# Patient Record
Sex: MALE | Race: WHITE | NOT HISPANIC OR LATINO | Employment: FULL TIME | URBAN - METROPOLITAN AREA
[De-identification: names, ages, dates, MRNs, and addresses within clinical notes are randomized per-mention and may not be internally consistent; named-entity substitution may affect disease eponyms.]

---

## 2018-08-13 ENCOUNTER — HOSPITAL ENCOUNTER (EMERGENCY)
Facility: HOSPITAL | Age: 63
Discharge: HOME | End: 2018-08-14
Attending: EMERGENCY MEDICINE | Admitting: EMERGENCY MEDICINE
Payer: COMMERCIAL

## 2018-08-13 ENCOUNTER — APPOINTMENT (EMERGENCY)
Dept: RADIOLOGY | Facility: HOSPITAL | Age: 63
End: 2018-08-13
Payer: COMMERCIAL

## 2018-08-13 DIAGNOSIS — R50.9 FEVER: Primary | ICD-10-CM

## 2018-08-13 DIAGNOSIS — R51.9 ACUTE NONINTRACTABLE HEADACHE, UNSPECIFIED HEADACHE TYPE: ICD-10-CM

## 2018-08-13 LAB
ALBUMIN SERPL BCP-MCNC: 3.7 G/DL (ref 3.5–5)
ALP SERPL-CCNC: 61 U/L (ref 46–116)
ALT SERPL W P-5'-P-CCNC: 29 U/L (ref 12–78)
ANION GAP SERPL CALCULATED.3IONS-SCNC: 9 MMOL/L (ref 4–13)
APTT PPP: 28 SECONDS (ref 24–36)
AST SERPL W P-5'-P-CCNC: 25 U/L (ref 5–45)
BASOPHILS # BLD AUTO: 0.03 THOUSANDS/ΜL (ref 0–0.1)
BASOPHILS NFR BLD AUTO: 1 % (ref 0–1)
BILIRUB SERPL-MCNC: 0.5 MG/DL (ref 0.2–1)
BILIRUB UR QL STRIP: NEGATIVE
BUN SERPL-MCNC: 14 MG/DL (ref 5–25)
CALCIUM SERPL-MCNC: 9.4 MG/DL (ref 8.3–10.1)
CHLORIDE SERPL-SCNC: 96 MMOL/L (ref 100–108)
CLARITY UR: CLEAR
CO2 SERPL-SCNC: 27 MMOL/L (ref 21–32)
COLOR UR: YELLOW
CREAT SERPL-MCNC: 1.13 MG/DL (ref 0.6–1.3)
EOSINOPHIL # BLD AUTO: 0 THOUSAND/ΜL (ref 0–0.61)
EOSINOPHIL NFR BLD AUTO: 0 % (ref 0–6)
ERYTHROCYTE [DISTWIDTH] IN BLOOD BY AUTOMATED COUNT: 12.1 % (ref 11.6–15.1)
GFR SERPL CREATININE-BSD FRML MDRD: 70 ML/MIN/1.73SQ M
GLUCOSE SERPL-MCNC: 126 MG/DL (ref 65–140)
GLUCOSE UR STRIP-MCNC: NEGATIVE MG/DL
HCT VFR BLD AUTO: 41.5 % (ref 36.5–49.3)
HGB BLD-MCNC: 14.1 G/DL (ref 12–17)
HGB UR QL STRIP.AUTO: NEGATIVE
IMM GRANULOCYTES # BLD AUTO: 0.03 THOUSAND/UL (ref 0–0.2)
IMM GRANULOCYTES NFR BLD AUTO: 1 % (ref 0–2)
INR PPP: 1.02 (ref 0.86–1.17)
KETONES UR STRIP-MCNC: ABNORMAL MG/DL
LACTATE SERPL-SCNC: 1.2 MMOL/L (ref 0.5–2)
LEUKOCYTE ESTERASE UR QL STRIP: NEGATIVE
LYMPHOCYTES # BLD AUTO: 0.62 THOUSANDS/ΜL (ref 0.6–4.47)
LYMPHOCYTES NFR BLD AUTO: 9 % (ref 14–44)
MCH RBC QN AUTO: 32.4 PG (ref 26.8–34.3)
MCHC RBC AUTO-ENTMCNC: 34 G/DL (ref 31.4–37.4)
MCV RBC AUTO: 95 FL (ref 82–98)
MONOCYTES # BLD AUTO: 0.54 THOUSAND/ΜL (ref 0.17–1.22)
MONOCYTES NFR BLD AUTO: 8 % (ref 4–12)
NEUTROPHILS # BLD AUTO: 5.42 THOUSANDS/ΜL (ref 1.85–7.62)
NEUTS SEG NFR BLD AUTO: 81 % (ref 43–75)
NITRITE UR QL STRIP: NEGATIVE
NRBC BLD AUTO-RTO: 0 /100 WBCS
PH UR STRIP.AUTO: 5.5 [PH] (ref 4.5–8)
PLATELET # BLD AUTO: 143 THOUSANDS/UL (ref 149–390)
PMV BLD AUTO: 9.2 FL (ref 8.9–12.7)
POTASSIUM SERPL-SCNC: 3.6 MMOL/L (ref 3.5–5.3)
PROT SERPL-MCNC: 7.9 G/DL (ref 6.4–8.2)
PROT UR STRIP-MCNC: ABNORMAL MG/DL
PROTHROMBIN TIME: 13.1 SECONDS (ref 11.8–14.2)
RBC # BLD AUTO: 4.35 MILLION/UL (ref 3.88–5.62)
SODIUM SERPL-SCNC: 132 MMOL/L (ref 136–145)
SP GR UR STRIP.AUTO: 1.01 (ref 1–1.03)
UROBILINOGEN UR QL STRIP.AUTO: 0.2 E.U./DL
WBC # BLD AUTO: 6.64 THOUSAND/UL (ref 4.31–10.16)

## 2018-08-13 PROCEDURE — 87040 BLOOD CULTURE FOR BACTERIA: CPT | Performed by: EMERGENCY MEDICINE

## 2018-08-13 PROCEDURE — 93005 ELECTROCARDIOGRAM TRACING: CPT

## 2018-08-13 PROCEDURE — 36415 COLL VENOUS BLD VENIPUNCTURE: CPT | Performed by: EMERGENCY MEDICINE

## 2018-08-13 PROCEDURE — 85025 COMPLETE CBC W/AUTO DIFF WBC: CPT | Performed by: EMERGENCY MEDICINE

## 2018-08-13 PROCEDURE — 71046 X-RAY EXAM CHEST 2 VIEWS: CPT

## 2018-08-13 PROCEDURE — 81001 URINALYSIS AUTO W/SCOPE: CPT | Performed by: EMERGENCY MEDICINE

## 2018-08-13 PROCEDURE — 83605 ASSAY OF LACTIC ACID: CPT | Performed by: EMERGENCY MEDICINE

## 2018-08-13 PROCEDURE — 87077 CULTURE AEROBIC IDENTIFY: CPT | Performed by: EMERGENCY MEDICINE

## 2018-08-13 PROCEDURE — 85610 PROTHROMBIN TIME: CPT | Performed by: EMERGENCY MEDICINE

## 2018-08-13 PROCEDURE — 80053 COMPREHEN METABOLIC PANEL: CPT | Performed by: EMERGENCY MEDICINE

## 2018-08-13 PROCEDURE — 85730 THROMBOPLASTIN TIME PARTIAL: CPT | Performed by: EMERGENCY MEDICINE

## 2018-08-13 PROCEDURE — 96361 HYDRATE IV INFUSION ADD-ON: CPT

## 2018-08-13 PROCEDURE — 96365 THER/PROPH/DIAG IV INF INIT: CPT

## 2018-08-13 RX ORDER — IBUPROFEN 400 MG/1
400 TABLET ORAL ONCE
Status: COMPLETED | OUTPATIENT
Start: 2018-08-13 | End: 2018-08-13

## 2018-08-13 RX ORDER — AMLODIPINE BESYLATE 5 MG/1
10 TABLET ORAL DAILY
COMMUNITY

## 2018-08-13 RX ORDER — ATORVASTATIN CALCIUM 10 MG/1
10 TABLET, FILM COATED ORAL DAILY
COMMUNITY

## 2018-08-13 RX ORDER — ASPIRIN 81 MG/1
81 TABLET ORAL DAILY
COMMUNITY

## 2018-08-13 RX ORDER — ACETAMINOPHEN 325 MG/1
975 TABLET ORAL ONCE
Status: COMPLETED | OUTPATIENT
Start: 2018-08-13 | End: 2018-08-13

## 2018-08-13 RX ORDER — MULTIVITAMIN
1 TABLET ORAL DAILY
COMMUNITY

## 2018-08-13 RX ORDER — BENAZEPRIL HYDROCHLORIDE 5 MG/1
5 TABLET, FILM COATED ORAL 2 TIMES DAILY
COMMUNITY
End: 2018-08-16 | Stop reason: CLARIF

## 2018-08-13 RX ADMIN — SODIUM CHLORIDE 1000 ML: 0.9 INJECTION, SOLUTION INTRAVENOUS at 23:54

## 2018-08-13 RX ADMIN — SODIUM CHLORIDE 1000 ML: 0.9 INJECTION, SOLUTION INTRAVENOUS at 22:16

## 2018-08-13 RX ADMIN — CEFTRIAXONE 2000 MG: 2 INJECTION, POWDER, FOR SOLUTION INTRAMUSCULAR; INTRAVENOUS at 23:04

## 2018-08-13 RX ADMIN — SODIUM CHLORIDE 1000 ML: 0.9 INJECTION, SOLUTION INTRAVENOUS at 22:33

## 2018-08-13 RX ADMIN — ACETAMINOPHEN 975 MG: 325 TABLET, FILM COATED ORAL at 22:29

## 2018-08-13 RX ADMIN — IBUPROFEN 400 MG: 400 TABLET ORAL at 22:30

## 2018-08-14 VITALS
SYSTOLIC BLOOD PRESSURE: 129 MMHG | DIASTOLIC BLOOD PRESSURE: 69 MMHG | HEIGHT: 75 IN | HEART RATE: 76 BPM | OXYGEN SATURATION: 92 % | TEMPERATURE: 99.9 F | WEIGHT: 217.37 LBS | BODY MASS INDEX: 27.03 KG/M2 | RESPIRATION RATE: 16 BRPM

## 2018-08-14 LAB
ATRIAL RATE: 87 BPM
BACTERIA UR QL AUTO: ABNORMAL /HPF
NON-SQ EPI CELLS URNS QL MICRO: ABNORMAL /HPF
P AXIS: 52 DEGREES
PR INTERVAL: 150 MS
QRS AXIS: -1 DEGREES
QRSD INTERVAL: 94 MS
QT INTERVAL: 350 MS
QTC INTERVAL: 421 MS
RBC #/AREA URNS AUTO: ABNORMAL /HPF
T WAVE AXIS: 37 DEGREES
VENTRICULAR RATE: 87 BPM
WBC #/AREA URNS AUTO: ABNORMAL /HPF

## 2018-08-14 PROCEDURE — 93010 ELECTROCARDIOGRAM REPORT: CPT | Performed by: INTERNAL MEDICINE

## 2018-08-14 PROCEDURE — 96361 HYDRATE IV INFUSION ADD-ON: CPT

## 2018-08-14 PROCEDURE — 99284 EMERGENCY DEPT VISIT MOD MDM: CPT

## 2018-08-14 NOTE — ED NOTES
Pt discharge instructions reviewed, Pt has no further questions at this time  Pt awake and alert, no signs of acute distress noted  Pt ambulated out of ED with a steady gait       Idalia Comment, SHAHZAD  08/14/18 5374

## 2018-08-14 NOTE — DISCHARGE INSTRUCTIONS
Fever in Adults   WHAT YOU NEED TO KNOW:   A fever is an increase in your body temperature  Normal body temperature is 98 6°F (37°C)  Fever is generally defined as greater than 100 4°F (38°C)  Common causes include an infection, injury, or disease such as arthritis  DISCHARGE INSTRUCTIONS:   Return to the emergency department if:   · Your fever does not go away or gets worse even after treatment  · You have a stiff neck and a bad headache  · You are confused  You may not be able to think clearly or remember things like you normally do  · Your heart beats faster than usual even after treatment  · You have shortness of breath or chest pain when you breathe  · You urinate small amounts or not at all  · Your skin, lips, or nails turn blue  Contact your healthcare provider if:   · You have abdominal pain or you feel bloated  · You have nausea or are vomiting  · You have pain or burning when you urinate, or you have pain in your back  · You have questions or concerns about your condition or care  Medicines: You may need any of the following:  · NSAIDs , such as ibuprofen, help decrease swelling, pain, and fever  This medicine is available with or without a doctor's order  NSAIDs can cause stomach bleeding or kidney problems in certain people  If you take blood thinner medicine, always ask if NSAIDs are safe for you  Always read the medicine label and follow directions  Do not give these medicines to children under 10months of age without direction from your child's healthcare provider  · Acetaminophen  decreases pain and fever  It is available without a doctor's order  Ask how much to take and how often to take it  Follow directions  Read the labels of all other medicines you are using to see if they also contain acetaminophen, or ask your doctor or pharmacist  Acetaminophen can cause liver damage if not taken correctly   Do not use more than 4 grams (4,000 milligrams) total of acetaminophen in one day  · Antibiotics  may be given if you have an infection caused by bacteria  · Take your medicine as directed  Contact your healthcare provider if you think your medicine is not helping or if you have side effects  Tell him of her if you are allergic to any medicine  Keep a list of the medicines, vitamins, and herbs you take  Include the amounts, and when and why you take them  Bring the list or the pill bottles to follow-up visits  Carry your medicine list with you in case of an emergency  Follow up with your healthcare provider as directed:  Write down your questions so you remember to ask them during your visits  Self-care:   · Drink more liquids as directed  A fever makes you sweat  This can increase your risk for dehydration  Liquids can help prevent dehydration  ¨ Drink at least 6 to 8 eight-ounce cups of clear liquids each day  Drink water, juice, or broth  Do not drink sports drinks  They may contain caffeine  ¨ Ask your healthcare provider if you should drink an oral rehydration solution (ORS)  An ORS has the right amounts of water, salts, and sugar you need to replace body fluids  · Dress in lightweight clothes  Shivers may be a sign that your fever is rising  Do not put extra blankets or clothes on  This may cause your fever to rise even higher  Dress in light, comfortable clothing  Use a lightweight blanket or sheet when you sleep  Change your clothes, blanket, or sheets if they get wet  · Cool yourself safely  Take a bath in cool or lukewarm water  Use an ice pack wrapped in a small towel or wet a washcloth with cool water  Place the ice pack or wet washcloth on your forehead or the back of your neck  © 2017 [employer] Information is for End User's use only and may not be sold, redistributed or otherwise used for commercial purposes   All illustrations and images included in CareNotes® are the copyrighted property of [employer]  or Prakash Ruiz  The above information is an  only  It is not intended as medical advice for individual conditions or treatments  Talk to your doctor, nurse or pharmacist before following any medical regimen to see if it is safe and effective for you

## 2018-08-14 NOTE — ED PROVIDER NOTES
History  Chief Complaint   Patient presents with    Fever - 9 weeks to 74 years     Pt reports fever as high as 103 since yesterday with aches and chills  Pt rook Axel Aspirin x3 at [employer], pt also states "pounding headache"     This 70-year-old male presents with 24 hour history of progressively worsening fever  Patient states he has headache, body aches, aching joints, nausea with decreased appetite  Patient has been alternating baby aspirin and Tylenol for his fever with limited if any improvement  Patient does have a sick contact with a granddaughter who had a cold last week  Patient denies any cough or shortness of breath  Patient denies any ear or throat pain  Patient denies any viral symptoms  Patient denies any rash  Patient denies any urinary complaints  Patient did have a couple episodes of diarrhea but denies any abdominal pain  History provided by:  Patient   used: No    Fever - 9 weeks to 74 years   Max temp prior to arrival:  103  Temp source:  Oral  Severity:  Moderate  Onset quality:  Gradual  Duration:  1 day  Progression:  Worsening  Chronicity:  New  Relieved by:  Acetaminophen  Worsened by:  Nothing  Ineffective treatments:  Aspirin  Associated symptoms: chills, headaches, myalgias and nausea    Associated symptoms: no chest pain, no confusion, no cough, no diarrhea, no dysuria, no ear pain, no rash, no sore throat and no vomiting    Risk factors: sick contacts        Prior to Admission Medications   Prescriptions Last Dose Informant Patient Reported? Taking?    Multiple Vitamin (MULTIVITAMIN) tablet   Yes Yes   Sig: Take 1 tablet by mouth daily   amLODIPine (NORVASC) 5 mg tablet   Yes Yes   Sig: Take 5 mg by mouth daily   aspirin (ECOTRIN LOW STRENGTH) 81 mg EC tablet   Yes Yes   Sig: Take 81 mg by mouth daily   atorvastatin (LIPITOR) 20 mg tablet   Yes Yes   Sig: Take 20 mg by mouth daily   benazepril (LOTENSIN) 5 mg tablet   Yes Yes   Sig: Take 5 mg by mouth 2 (two) times a day      Facility-Administered Medications: None       History reviewed  No pertinent past medical history  History reviewed  No pertinent surgical history  History reviewed  No pertinent family history  I have reviewed and agree with the history as documented  Social History   Substance Use Topics    Smoking status: Never Smoker    Smokeless tobacco: Not on file    Alcohol use Yes      Comment: daily 2-3 anisha        Review of Systems   Constitutional: Positive for chills, diaphoresis and fever  Negative for activity change and appetite change  HENT: Negative for ear pain, facial swelling, sore throat, tinnitus and voice change  Eyes: Negative for photophobia, pain and redness  Respiratory: Negative for cough, chest tightness, shortness of breath and wheezing  Cardiovascular: Negative for chest pain, palpitations and leg swelling  Gastrointestinal: Positive for nausea  Negative for abdominal distention, abdominal pain, constipation, diarrhea and vomiting  Genitourinary: Negative for difficulty urinating, dysuria, flank pain, hematuria and urgency  Musculoskeletal: Positive for arthralgias and myalgias  Negative for back pain, gait problem and neck pain  Skin: Negative for rash and wound  Neurological: Positive for headaches  Negative for dizziness, syncope, speech difficulty and weakness  Psychiatric/Behavioral: Negative for agitation, behavioral problems and confusion  Physical Exam  Physical Exam   Constitutional: He is oriented to person, place, and time  He appears well-developed and well-nourished  He is cooperative  No distress  HENT:   Head: Normocephalic and atraumatic  Mouth/Throat: Oropharynx is clear and moist    Eyes: EOM and lids are normal  Pupils are equal, round, and reactive to light  Right eye exhibits no discharge  Left eye exhibits no discharge  Right conjunctiva is not injected  Left conjunctiva is not injected     Neck: Trachea normal, normal range of motion, full passive range of motion without pain and phonation normal  Neck supple  Cardiovascular: Normal rate, regular rhythm, normal heart sounds and normal pulses  No murmur heard  Pulses:       Dorsalis pedis pulses are 2+ on the right side, and 2+ on the left side  Pulmonary/Chest: Effort normal and breath sounds normal  He has no wheezes  He exhibits no tenderness  Abdominal: Soft  He exhibits no distension  There is no tenderness  Musculoskeletal: Normal range of motion  He exhibits no edema  Neurological: He is alert and oriented to person, place, and time  He has normal strength  No cranial nerve deficit  GCS eye subscore is 4  GCS verbal subscore is 5  GCS motor subscore is 6  Skin: Skin is warm, dry and intact  Capillary refill takes less than 2 seconds  No rash noted  Psychiatric: He has a normal mood and affect  His speech is normal and behavior is normal    Vitals reviewed        Vital Signs  ED Triage Vitals   Temperature Pulse Respirations Blood Pressure SpO2   08/13/18 2119 08/13/18 2119 08/13/18 2119 08/13/18 2119 08/13/18 2119   (!) 102 9 °F (39 4 °C) 97 18 153/75 95 %      Temp Source Heart Rate Source Patient Position - Orthostatic VS BP Location FiO2 (%)   08/13/18 2119 08/13/18 2119 08/13/18 2119 08/13/18 2119 --   Oral Monitor Sitting Left arm       Pain Score       08/13/18 2201       4           Vitals:    08/13/18 2230 08/13/18 2300 08/13/18 2355 08/14/18 0000   BP: 159/81 119/77 127/70 127/69   Pulse: 86 88 77 77   Patient Position - Orthostatic VS: Lying Lying Lying Lying       Visual Acuity      ED Medications  Medications   sodium chloride 0 9 % bolus 1,000 mL (0 mL Intravenous Stopped 8/13/18 2350)     Followed by   sodium chloride 0 9 % bolus 1,000 mL (0 mL Intravenous Stopped 8/13/18 2350)     Followed by   sodium chloride 0 9 % bolus 1,000 mL (1,000 mL Intravenous New Bag 8/13/18 2354)   ibuprofen (MOTRIN) tablet 400 mg (400 mg Oral Given 8/13/18 2230)   acetaminophen (TYLENOL) tablet 975 mg (975 mg Oral Given 8/13/18 2229)   cefTRIAXone (ROCEPHIN) 2,000 mg in dextrose 5 % 50 mL IVPB (0 mg Intravenous Stopped 8/13/18 2350)       Diagnostic Studies  Results Reviewed     Procedure Component Value Units Date/Time    UA w Reflex to Microscopic w Reflex to Culture [[id number]]  (Abnormal) Collected:  08/13/18 2352    Lab Status:  Final result Specimen:  Urine from Urine, Clean Catch Updated:  08/13/18 2359     Color, UA Yellow     Clarity, UA Clear     Specific Gravity, UA 1 015     pH, UA 5 5     Leukocytes, UA Negative     Nitrite, UA Negative     Protein, UA Trace (A) mg/dl      Glucose, UA Negative mg/dl      Ketones, UA Trace (A) mg/dl      Urobilinogen, UA 0 2 E U /dl      Bilirubin, UA Negative     Blood, UA Negative    Urine Microscopic [[id number]] Collected:  08/13/18 2352    Lab Status: In process Specimen:  Urine from Urine, Clean Catch Updated:  08/13/18 2359    Lactic acid x2 [[id number]]  (Normal) Collected:  08/13/18 2212    Lab Status:  Final result Specimen:  Blood from Arm, Right Updated:  08/13/18 2252     LACTIC ACID 1 2 mmol/L     Narrative:         Result may be elevated if tourniquet was used during collection      Comprehensive metabolic panel [[id number]]  (Abnormal) Collected:  08/13/18 2211    Lab Status:  Final result Specimen:  Blood from Arm, Right Updated:  08/13/18 2244     Sodium 132 (L) mmol/L      Potassium 3 6 mmol/L      Chloride 96 (L) mmol/L      CO2 27 mmol/L      Anion Gap 9 mmol/L      BUN 14 mg/dL      Creatinine 1 13 mg/dL      Glucose 126 mg/dL      Calcium 9 4 mg/dL      AST 25 U/L      ALT 29 U/L      Alkaline Phosphatase 61 U/L      Total Protein 7 9 g/dL      Albumin 3 7 g/dL      Total Bilirubin 0 50 mg/dL      eGFR 70 ml/min/1 73sq m     Narrative:         National Kidney Disease Education Program recommendations are as follows:  GFR calculation is accurate only with a steady state creatinine  Chronic Kidney disease less than 60 ml/min/1 73 sq  meters  Kidney failure less than 15 ml/min/1 73 sq  meters  Protime-INR [[id number]]  (Normal) Collected:  08/13/18 2212    Lab Status:  Final result Specimen:  Blood from Arm, Right Updated:  08/13/18 2239     Protime 13 1 seconds      INR 1 02    APTT [[id number]]  (Normal) Collected:  08/13/18 2212    Lab Status:  Final result Specimen:  Blood from Arm, Right Updated:  08/13/18 2239     PTT 28 seconds     CBC and differential [[id number]]  (Abnormal) Collected:  08/13/18 2211    Lab Status:  Final result Specimen:  Blood from Arm, Right Updated:  08/13/18 2224     WBC 6 64 Thousand/uL      RBC 4 35 Million/uL      Hemoglobin 14 1 g/dL      Hematocrit 41 5 %      MCV 95 fL      MCH 32 4 pg      MCHC 34 0 g/dL      RDW 12 1 %      MPV 9 2 fL      Platelets 626 (L) Thousands/uL      nRBC 0 /100 WBCs      Neutrophils Relative 81 (H) %      Immat GRANS % 1 %      Lymphocytes Relative 9 (L) %      Monocytes Relative 8 %      Eosinophils Relative 0 %      Basophils Relative 1 %      Neutrophils Absolute 5 42 Thousands/µL      Immature Grans Absolute 0 03 Thousand/uL      Lymphocytes Absolute 0 62 Thousands/µL      Monocytes Absolute 0 54 Thousand/µL      Eosinophils Absolute 0 00 Thousand/µL      Basophils Absolute 0 03 Thousands/µL     Blood culture #1 [[id number]] Collected:  08/13/18 2211    Lab Status: In process Specimen:  Blood from Arm, Right Updated:  08/13/18 2218    Blood culture #2 [[id number]] Collected:  08/13/18 2213    Lab Status:   In process Specimen:  Blood from Arm, Left Updated:  08/13/18 2218    Lactic acid x2 [[id number]]     Lab Status:  No result Specimen:  Blood                  XR chest pa & lateral   ED Interpretation by Eric Charlton MD (08/13 2309)   No acute cardiopulmonary process                 Procedures  ECG 12 Lead Documentation  Date/Time: 8/13/2018 10:21 PM  Performed by: Mony Landers  Authorized by: Mony Landers     Indications / Diagnosis: Sepsis  ECG reviewed by me, the ED Provider: yes    Patient location:  ED  Previous ECG:     Previous ECG:  Unavailable  Interpretation:     Interpretation: normal    Rate:     ECG rate:  87    ECG rate assessment: normal    Rhythm:     Rhythm: sinus rhythm    Ectopy:     Ectopy: none    QRS:     QRS axis:  Normal    QRS intervals:  Normal  Conduction:     Conduction: normal    ST segments:     ST segments:  Normal  T waves:     T waves: normal             Phone Contacts  ED Phone Contact    ED Course                         Initial Sepsis Screening     [address] Name 08/13/18 2200                Is the patient's history suggestive of a new or worsening infection? (!)  Yes (Proceed)  -AR        Suspected source of infection suspect infection, source unknown  -AR        Are two or more of the following signs & symptoms of infection both present and new to the patient? No  -AR        Indicate SIRS criteria Hyperthemia > 38 3C (100 9F)  -AR        If the answer is yes to both questions, suspicion of sepsis is present          If severe sepsis is present AND tissue hypoperfusion perists in the hour after fluid resuscitation or lactate > 4, the patient meets criteria for SEPTIC SHOCK          Are any of the following organ dysfunction criteria present within 6 hours of suspected infection and SIRS criteria that are NOT considered to be chronic conditions?         Organ dysfunction          Date of presentation of severe sepsis          Time of presentation of severe sepsis          Tissue hypoperfusion persists in the hour after crystalloid fluid administration, evidenced, by either:          Was hypotension present within one hour of the conclusion of crystalloid fluid administration?           Date of presentation of septic shock          Time of presentation of septic shock            User Key  (r) = Recorded By, (t) = Taken By, (c) = Cosigned By    [address] Name Provider Trisha Gordon MD Physician MDM  Number of Diagnoses or Management Options  Diagnosis management comments: Patient's presentation is concerning for sepsis  Will do full evaluation to search for cause  Will re-evaluate after results  Patient's workup here demonstrates no evidence of sepsis  There is no source for his infection either  Patient did receive one dose of empiric Rocephin  Patient likely has a viral infection  Patient states he feels much better after the fluids and Tylenol  Patient's temperature currently is 99 9  Will discharge patient  Will write note for work until three days from now  Amount and/or Complexity of Data Reviewed  Clinical lab tests: ordered and reviewed  Tests in the radiology section of CPT®: ordered and reviewed  Tests in the medicine section of CPT®: ordered and reviewed  Independent visualization of images, tracings, or specimens: yes    Risk of Complications, Morbidity, and/or Mortality  Presenting problems: high  Diagnostic procedures: high  Management options: moderate    Patient Progress  Patient progress: improved    CritCare Time    Disposition  Final diagnoses:   Fever   Acute nonintractable headache, unspecified headache type     Time reflects when diagnosis was documented in both MDM as applicable and the Disposition within this note     Time User Action Codes Description Comment    8/14/2018 12:07 AM Nesha Kelley Add [R50 9] Fever     8/14/2018 12:07 AM Nesha Kelley Add [R51] Acute nonintractable headache, unspecified headache type       ED Disposition     ED Disposition Condition Comment    Discharge  [address]  [facility] [address] discharge to home/self care  Condition at discharge: Stable        Follow-up Information     Follow up With Specialties Details Why Jose Angel Del Castillo MD Internal Medicine Schedule an appointment as soon as possible for a visit in 4 days If you continue to have fevers and are unable to return to work [phone]  [address] [place] [id number]  [phone]            Patient's Medications   Discharge Prescriptions    No medications on file     No discharge procedures on file      ED Provider  Electronically Signed by           Rober Cheung MD  08/14/18 0639

## 2018-08-16 ENCOUNTER — HOSPITAL ENCOUNTER (INPATIENT)
Facility: HOSPITAL | Age: 63
LOS: 2 days | Discharge: HOME | DRG: 372 | End: 2018-08-18
Attending: EMERGENCY MEDICINE | Admitting: INTERNAL MEDICINE
Payer: COMMERCIAL

## 2018-08-16 DIAGNOSIS — R78.81 GRAM-NEGATIVE BACTEREMIA: Primary | ICD-10-CM

## 2018-08-16 PROBLEM — K52.9 GASTROENTERITIS: Status: ACTIVE | Noted: 2018-08-16

## 2018-08-16 PROBLEM — F10.10 ALCOHOL ABUSE: Status: ACTIVE | Noted: 2018-08-16

## 2018-08-16 PROBLEM — E78.5 HLD (HYPERLIPIDEMIA): Status: ACTIVE | Noted: 2018-08-16

## 2018-08-16 LAB
ANION GAP SERPL CALCULATED.3IONS-SCNC: 6 MMOL/L (ref 4–13)
BACTERIA UR QL AUTO: ABNORMAL /HPF
BASOPHILS # BLD AUTO: 0.02 THOUSANDS/ΜL (ref 0–0.1)
BASOPHILS NFR BLD AUTO: 0 % (ref 0–1)
BILIRUB UR QL STRIP: ABNORMAL
BUN SERPL-MCNC: 17 MG/DL (ref 5–25)
CALCIUM SERPL-MCNC: 9.2 MG/DL (ref 8.3–10.1)
CAOX CRY URNS QL MICRO: ABNORMAL /HPF
CHLORIDE SERPL-SCNC: 106 MMOL/L (ref 100–108)
CLARITY UR: CLEAR
CO2 SERPL-SCNC: 29 MMOL/L (ref 21–32)
COLOR UR: YELLOW
CREAT SERPL-MCNC: 1.05 MG/DL (ref 0.6–1.3)
EOSINOPHIL # BLD AUTO: 0.1 THOUSAND/ΜL (ref 0–0.61)
EOSINOPHIL NFR BLD AUTO: 2 % (ref 0–6)
ERYTHROCYTE [DISTWIDTH] IN BLOOD BY AUTOMATED COUNT: 12.2 % (ref 11.6–15.1)
GFR SERPL CREATININE-BSD FRML MDRD: 76 ML/MIN/1.73SQ M
GLUCOSE SERPL-MCNC: 103 MG/DL (ref 65–140)
GLUCOSE UR STRIP-MCNC: NEGATIVE MG/DL
HCT VFR BLD AUTO: 39.2 % (ref 36.5–49.3)
HGB BLD-MCNC: 13.4 G/DL (ref 12–17)
HGB UR QL STRIP.AUTO: ABNORMAL
HYALINE CASTS #/AREA URNS LPF: ABNORMAL /LPF
IMM GRANULOCYTES # BLD AUTO: 0.03 THOUSAND/UL (ref 0–0.2)
IMM GRANULOCYTES NFR BLD AUTO: 1 % (ref 0–2)
KETONES UR STRIP-MCNC: NEGATIVE MG/DL
LACTATE SERPL-SCNC: 0.7 MMOL/L (ref 0.5–2)
LEUKOCYTE ESTERASE UR QL STRIP: NEGATIVE
LYMPHOCYTES # BLD AUTO: 1.42 THOUSANDS/ΜL (ref 0.6–4.47)
LYMPHOCYTES NFR BLD AUTO: 26 % (ref 14–44)
MCH RBC QN AUTO: 32.9 PG (ref 26.8–34.3)
MCHC RBC AUTO-ENTMCNC: 34.2 G/DL (ref 31.4–37.4)
MCV RBC AUTO: 96 FL (ref 82–98)
MONOCYTES # BLD AUTO: 0.96 THOUSAND/ΜL (ref 0.17–1.22)
MONOCYTES NFR BLD AUTO: 17 % (ref 4–12)
MUCOUS THREADS UR QL AUTO: ABNORMAL
NEUTROPHILS # BLD AUTO: 3.02 THOUSANDS/ΜL (ref 1.85–7.62)
NEUTS SEG NFR BLD AUTO: 54 % (ref 43–75)
NITRITE UR QL STRIP: NEGATIVE
NON-SQ EPI CELLS URNS QL MICRO: ABNORMAL /HPF
NRBC BLD AUTO-RTO: 0 /100 WBCS
PH UR STRIP.AUTO: 6 [PH] (ref 4.5–8)
PLATELET # BLD AUTO: 178 THOUSANDS/UL (ref 149–390)
PMV BLD AUTO: 9.1 FL (ref 8.9–12.7)
POTASSIUM SERPL-SCNC: 3.5 MMOL/L (ref 3.5–5.3)
PROT UR STRIP-MCNC: ABNORMAL MG/DL
RBC # BLD AUTO: 4.07 MILLION/UL (ref 3.88–5.62)
RBC #/AREA URNS AUTO: ABNORMAL /HPF
SODIUM SERPL-SCNC: 141 MMOL/L (ref 136–145)
SP GR UR STRIP.AUTO: 1.02 (ref 1–1.03)
UROBILINOGEN UR QL STRIP.AUTO: 0.2 E.U./DL
WBC # BLD AUTO: 5.55 THOUSAND/UL (ref 4.31–10.16)
WBC #/AREA URNS AUTO: ABNORMAL /HPF

## 2018-08-16 PROCEDURE — 83605 ASSAY OF LACTIC ACID: CPT | Performed by: EMERGENCY MEDICINE

## 2018-08-16 PROCEDURE — 85025 COMPLETE CBC W/AUTO DIFF WBC: CPT | Performed by: EMERGENCY MEDICINE

## 2018-08-16 PROCEDURE — 80048 BASIC METABOLIC PNL TOTAL CA: CPT | Performed by: EMERGENCY MEDICINE

## 2018-08-16 PROCEDURE — 96361 HYDRATE IV INFUSION ADD-ON: CPT

## 2018-08-16 PROCEDURE — 99223 1ST HOSP IP/OBS HIGH 75: CPT | Performed by: PHYSICIAN ASSISTANT

## 2018-08-16 PROCEDURE — 36415 COLL VENOUS BLD VENIPUNCTURE: CPT | Performed by: EMERGENCY MEDICINE

## 2018-08-16 PROCEDURE — 87040 BLOOD CULTURE FOR BACTERIA: CPT | Performed by: EMERGENCY MEDICINE

## 2018-08-16 PROCEDURE — 96374 THER/PROPH/DIAG INJ IV PUSH: CPT

## 2018-08-16 PROCEDURE — 99284 EMERGENCY DEPT VISIT MOD MDM: CPT

## 2018-08-16 PROCEDURE — 81001 URINALYSIS AUTO W/SCOPE: CPT | Performed by: EMERGENCY MEDICINE

## 2018-08-16 RX ORDER — AMLODIPINE BESYLATE 10 MG/1
10 TABLET ORAL DAILY
Status: DISCONTINUED | OUTPATIENT
Start: 2018-08-17 | End: 2018-08-18 | Stop reason: HOSPADM

## 2018-08-16 RX ORDER — SODIUM CHLORIDE 9 MG/ML
125 INJECTION, SOLUTION INTRAVENOUS CONTINUOUS
Status: DISCONTINUED | OUTPATIENT
Start: 2018-08-16 | End: 2018-08-16

## 2018-08-16 RX ORDER — THIAMINE MONONITRATE (VIT B1) 100 MG
100 TABLET ORAL DAILY
Status: DISCONTINUED | OUTPATIENT
Start: 2018-08-17 | End: 2018-08-18 | Stop reason: HOSPADM

## 2018-08-16 RX ORDER — BENAZEPRIL HYDROCHLORIDE AND HYDROCHLOROTHIAZIDE 20; 12.5 MG/1; MG/1
1 TABLET ORAL 2 TIMES DAILY
COMMUNITY

## 2018-08-16 RX ORDER — LISINOPRIL 20 MG/1
20 TABLET ORAL DAILY
Status: DISCONTINUED | OUTPATIENT
Start: 2018-08-17 | End: 2018-08-18 | Stop reason: HOSPADM

## 2018-08-16 RX ORDER — ATORVASTATIN CALCIUM 10 MG/1
10 TABLET, FILM COATED ORAL DAILY
Status: DISCONTINUED | OUTPATIENT
Start: 2018-08-17 | End: 2018-08-18 | Stop reason: HOSPADM

## 2018-08-16 RX ORDER — HEPARIN SODIUM 5000 [USP'U]/ML
5000 INJECTION, SOLUTION INTRAVENOUS; SUBCUTANEOUS EVERY 8 HOURS SCHEDULED
Status: DISCONTINUED | OUTPATIENT
Start: 2018-08-16 | End: 2018-08-18 | Stop reason: HOSPADM

## 2018-08-16 RX ORDER — ASPIRIN 81 MG/1
81 TABLET ORAL DAILY
Status: DISCONTINUED | OUTPATIENT
Start: 2018-08-17 | End: 2018-08-18 | Stop reason: HOSPADM

## 2018-08-16 RX ORDER — ACETAMINOPHEN 325 MG/1
650 TABLET ORAL EVERY 6 HOURS PRN
Status: DISCONTINUED | OUTPATIENT
Start: 2018-08-16 | End: 2018-08-18 | Stop reason: HOSPADM

## 2018-08-16 RX ORDER — FOLIC ACID 1 MG/1
1 TABLET ORAL DAILY
Status: DISCONTINUED | OUTPATIENT
Start: 2018-08-17 | End: 2018-08-18 | Stop reason: HOSPADM

## 2018-08-16 RX ORDER — ONDANSETRON 2 MG/ML
4 INJECTION INTRAMUSCULAR; INTRAVENOUS EVERY 6 HOURS PRN
Status: DISCONTINUED | OUTPATIENT
Start: 2018-08-16 | End: 2018-08-18 | Stop reason: HOSPADM

## 2018-08-16 RX ORDER — SODIUM CHLORIDE 9 MG/ML
75 INJECTION, SOLUTION INTRAVENOUS CONTINUOUS
Status: DISCONTINUED | OUTPATIENT
Start: 2018-08-16 | End: 2018-08-18 | Stop reason: HOSPADM

## 2018-08-16 RX ADMIN — SODIUM CHLORIDE 1000 ML: 0.9 INJECTION, SOLUTION INTRAVENOUS at 19:03

## 2018-08-16 RX ADMIN — CEFEPIME HYDROCHLORIDE 2000 MG: 2 INJECTION, POWDER, FOR SOLUTION INTRAVENOUS at 19:59

## 2018-08-16 RX ADMIN — SODIUM CHLORIDE 75 ML/HR: 0.9 INJECTION, SOLUTION INTRAVENOUS at 22:31

## 2018-08-16 NOTE — ED PROVIDER NOTES
History  Chief Complaint   Patient presents with    Abnormal Lab     pt called to come back to ed for positive blood cultures after being seen in ED sunday for fevers unrelieved by tylenol and diarrhea  16-year-old male returns to emergency department for re-evaluation after being notified that he had 2 positive blood cultures that were drawn in the emergency department on August 13th  Patient states that he developed fever with chills arthralgias, body aches and diarrhea on Monday 4 days ago  The etiology of his symptoms was unclear however he felt better and was discharged home  He has continued to improve over the past 2 days he has not had fever for over 24 hours  He did have multiple episodes of loose bright green color diarrhea that have slow down  He did not have a bowel movement today  His blood cultures have both grown out curved gram-negative rods  He does recall eating clams on Sunday 1 day prior to the onset of his symptoms  He did discard some clams that appeared to be dead        History provided by:  Patient and medical records   used: No    Medical Problem   Location:  Recent febrile illness  Quality:  Resolving  Severity:  Severe  Onset quality:  Sudden  Progression:  Partially resolved  Chronicity:  New  Context:  May have eaten spoiled clams  Relieved by:  Nothing  Worsened by:  Nothing  Associated symptoms: abdominal pain, diarrhea and fever    Associated symptoms: no cough and no vomiting        Prior to Admission Medications   Prescriptions Last Dose Informant Patient Reported? Taking?    Multiple Vitamin (MULTIVITAMIN) tablet 8/16/2018 at Unknown time  Yes Yes   Sig: Take 1 tablet by mouth daily   amLODIPine (NORVASC) 5 mg tablet 8/16/2018 at Unknown time  Yes Yes   Sig: Take 10 mg by mouth daily     aspirin (ECOTRIN LOW STRENGTH) 81 mg EC tablet 8/16/2018 at Unknown time  Yes Yes   Sig: Take 81 mg by mouth daily   atorvastatin (LIPITOR) 10 mg tablet 8/16/2018 at Unknown time  Yes Yes   Sig: Take 10 mg by mouth daily     benazepril-hydrochlorthiazide (LOTENSIN HCT) 20-12 5 MG per tablet Unknown at Unknown time  Yes No   Sig: Take 1 tablet by mouth 2 (two) times a day      Facility-Administered Medications: None       Past Medical History:   Diagnosis Date    HLD (hyperlipidemia)     HTN (hypertension)        Past Surgical History:   Procedure Laterality Date    HERNIA REPAIR      20 years ago       Family History   Problem Relation Age of Onset    Diabetes Father     Heart disease Father     Prostate cancer Brother      I have reviewed and agree with the history as documented  Social History   Substance Use Topics    Smoking status: Never Smoker    Smokeless tobacco: Never Used    Alcohol use Yes      Comment: daily 2-4 anisha        Review of Systems   Constitutional: Positive for appetite change and fever  Respiratory: Negative for cough  Gastrointestinal: Positive for abdominal pain and diarrhea  Negative for vomiting  Genitourinary: Negative for dysuria  Musculoskeletal: Negative for back pain  Neurological: Negative for weakness  All other systems reviewed and are negative  Physical Exam  Physical Exam   Constitutional: He is oriented to person, place, and time  Vital signs are normal  He appears well-developed and well-nourished  HENT:   Head: Normocephalic and atraumatic  Eyes: Conjunctivae and EOM are normal  Pupils are equal, round, and reactive to light  Neck: Normal range of motion  Neck supple  Cardiovascular: Normal rate, regular rhythm, normal heart sounds and intact distal pulses  Pulmonary/Chest: Effort normal and breath sounds normal  No accessory muscle usage  No respiratory distress  He exhibits no tenderness  Abdominal: Soft  Normal appearance and bowel sounds are normal  He exhibits no distension  There is no tenderness  There is no rebound and no guarding  Musculoskeletal: Normal range of motion   He exhibits no edema, tenderness or deformity  Lymphadenopathy:     He has no cervical adenopathy  Neurological: He is alert and oriented to person, place, and time  He has normal strength and normal reflexes  Coordination normal    Skin: Skin is warm, dry and intact  No rash noted  Psychiatric: He has a normal mood and affect  His behavior is normal  Judgment and thought content normal    Nursing note and vitals reviewed        Vital Signs  ED Triage Vitals   Temperature Pulse Respirations Blood Pressure SpO2   08/16/18 1741 08/16/18 1741 08/16/18 1741 08/16/18 1741 08/16/18 1741   98 3 °F (36 8 °C) 70 18 152/81 97 %      Temp Source Heart Rate Source Patient Position - Orthostatic VS BP Location FiO2 (%)   08/16/18 1741 08/16/18 1741 08/16/18 1741 08/16/18 1741 --   Oral Monitor Sitting Right arm       Pain Score       08/16/18 2000       No Pain           Vitals:    08/16/18 1741 08/16/18 1941 08/16/18 2000 08/16/18 2117   BP: 152/81 151/87 162/97 156/91   Pulse: 70 60 72 67   Patient Position - Orthostatic VS: Sitting Lying Sitting Sitting       Visual Acuity      ED Medications  Medications   amLODIPine (NORVASC) tablet 10 mg (not administered)   aspirin (ECOTRIN LOW STRENGTH) EC tablet 81 mg (not administered)   atorvastatin (LIPITOR) tablet 10 mg (not administered)   lisinopril (ZESTRIL) tablet 20 mg (not administered)   thiamine (VITAMIN B1) tablet 100 mg (not administered)   folic acid (FOLVITE) tablet 1 mg (not administered)   multivitamin-minerals (CENTRUM) tablet 1 tablet (not administered)   sodium chloride 0 9 % infusion (75 mL/hr Intravenous New Bag 8/16/18 2231)   ondansetron (ZOFRAN) injection 4 mg (not administered)   heparin (porcine) subcutaneous injection 5,000 Units (5,000 Units Subcutaneous Not Given 8/16/18 2300)   acetaminophen (TYLENOL) tablet 650 mg (not administered)   cefepime (MAXIPIME) 2,000 mg in dextrose 5 % 50 mL IVPB (not administered)   sodium chloride 0 9 % bolus 1,000 mL (0 mL Intravenous Stopped 8/16/18 2111)   cefepime (MAXIPIME) 2 g/50 mL dextrose IVPB (0 mg Intravenous Stopped 8/16/18 2111)       Diagnostic Studies  Results Reviewed     Procedure Component Value Units Date/Time    Lactic acid, plasma [[id number]]  (Normal) Collected:  08/16/18 1902    Lab Status:  Final result Specimen:  Blood from Arm, Left Updated:  08/16/18 1948     LACTIC ACID 0 7 mmol/L     Narrative:         Result may be elevated if tourniquet was used during collection  Urine Microscopic [[id number]]  (Abnormal) Collected:  08/16/18 1903    Lab Status:  Final result Specimen:  Urine from Urine, Clean Catch Updated:  08/16/18 1945     RBC, UA 2-4 (A) /hpf      WBC, UA None Seen /hpf      Epithelial Cells Occasional /hpf      Bacteria, UA Occasional /hpf      Hyaline Casts, UA 1-2 (A) /lpf      Ca Oxalate Hermila, UA Moderate (A) /hpf      MUCOUS THREADS Moderate (A)    Basic metabolic panel [[id number]] Collected:  08/16/18 1902    Lab Status:  Final result Specimen:  Blood from Arm, Left Updated:  08/16/18 1939     Sodium 141 mmol/L      Potassium 3 5 mmol/L      Chloride 106 mmol/L      CO2 29 mmol/L      Anion Gap 6 mmol/L      BUN 17 mg/dL      Creatinine 1 05 mg/dL      Glucose 103 mg/dL      Calcium 9 2 mg/dL      eGFR 76 ml/min/1 73sq m     Narrative:         National Kidney Disease Education Program recommendations are as follows:  GFR calculation is accurate only with a steady state creatinine  Chronic Kidney disease less than 60 ml/min/1 73 sq  meters  Kidney failure less than 15 ml/min/1 73 sq  meters  Blood culture #1 [[id number]] Collected:  08/16/18 1933    Lab Status:   In process Specimen:  Blood from Arm, Right Updated:  08/16/18 1937    CBC and differential [[id number]]  (Abnormal) Collected:  08/16/18 1902    Lab Status:  Final result Specimen:  Blood from Arm, Left Updated:  08/16/18 1921     WBC 5 55 Thousand/uL      RBC 4 07 Million/uL      Hemoglobin 13 4 g/dL      Hematocrit 39 2 %      MCV 96 fL      MCH 32 9 pg      MCHC 34 2 g/dL      RDW 12 2 %      MPV 9 1 fL      Platelets 420 Thousands/uL      nRBC 0 /100 WBCs      Neutrophils Relative 54 %      Immat GRANS % 1 %      Lymphocytes Relative 26 %      Monocytes Relative 17 (H) %      Eosinophils Relative 2 %      Basophils Relative 0 %      Neutrophils Absolute 3 02 Thousands/µL      Immature Grans Absolute 0 03 Thousand/uL      Lymphocytes Absolute 1 42 Thousands/µL      Monocytes Absolute 0 96 Thousand/µL      Eosinophils Absolute 0 10 Thousand/µL      Basophils Absolute 0 02 Thousands/µL     UA w Reflex to Microscopic w Reflex to Culture [[id number]]  (Abnormal) Collected:  08/16/18 1903    Lab Status:  Final result Specimen:  Urine from Urine, Clean Catch Updated:  08/16/18 1921     Color, UA Yellow     Clarity, UA Clear     Specific [place], UA 1 020     pH, UA 6 0     Leukocytes, UA Negative     Nitrite, UA Negative     Protein, UA Trace (A) mg/dl      Glucose, UA Negative mg/dl      Ketones, UA Negative mg/dl      Urobilinogen, UA 0 2 E U /dl      Bilirubin, UA Small (A)     Blood, UA Trace-Intact (A)    Blood culture #2 [[id number]] Collected:  08/16/18 1902    Lab Status: In process Specimen:  Blood from Arm, Left Updated:  08/16/18 1916                 No orders to display              Procedures  Procedures       Phone Contacts  ED Phone Contact    ED Course  ED Course as of Aug 16 2337   Thu Aug 16, 2018   1945 Case reviewed with   and her of Infectious Disease he recommends broad coverage pending finalized culture result  Will start cefepime                                  MDM  Number of Diagnoses or Management Options  Gram-negative bacteremia: new and requires workup     Amount and/or Complexity of Data Reviewed  Clinical lab tests: ordered and reviewed  Tests in the radiology section of CPT®: reviewed  Decide to obtain previous medical records or to obtain history from someone other than the patient: yes  Discuss the patient with other providers: yes  Independent visualization of images, tracings, or specimens: yes    Patient Progress  Patient progress: stable    CritCare Time    Disposition  Final diagnoses:   Gram-negative bacteremia     Time reflects when diagnosis was documented in both MDM as applicable and the Disposition within this note     Time User Action Codes Description Comment    8/16/2018  8:03 PM Tika Whitehead Add [R78 81] Gram-negative bacteremia       ED Disposition     ED Disposition Condition Comment    Admit  Case was discussed with Dr Sav Menjivar and the patient's admission status was agreed to be Admission Status: inpatient status to the service of Dr Sav Menjivar   Follow-up Information    None         Current Discharge Medication List      CONTINUE these medications which have NOT CHANGED    Details   amLODIPine (NORVASC) 5 mg tablet Take 10 mg by mouth daily        aspirin (ECOTRIN LOW STRENGTH) 81 mg EC tablet Take 81 mg by mouth daily      atorvastatin (LIPITOR) 10 mg tablet Take 10 mg by mouth daily        Multiple Vitamin (MULTIVITAMIN) tablet Take 1 tablet by mouth daily      benazepril-hydrochlorthiazide (LOTENSIN HCT) 20-12 5 MG per tablet Take 1 tablet by mouth 2 (two) times a day           No discharge procedures on file      ED Provider  Electronically Signed by           Libra Spence DO  08/16/18 2878

## 2018-08-17 LAB
ALBUMIN SERPL BCP-MCNC: 2.9 G/DL (ref 3.5–5)
ALP SERPL-CCNC: 47 U/L (ref 46–116)
ALT SERPL W P-5'-P-CCNC: 33 U/L (ref 12–78)
ANION GAP SERPL CALCULATED.3IONS-SCNC: 8 MMOL/L (ref 4–13)
AST SERPL W P-5'-P-CCNC: 29 U/L (ref 5–45)
BILIRUB SERPL-MCNC: 0.3 MG/DL (ref 0.2–1)
BUN SERPL-MCNC: 17 MG/DL (ref 5–25)
CALCIUM SERPL-MCNC: 8.6 MG/DL (ref 8.3–10.1)
CHLORIDE SERPL-SCNC: 108 MMOL/L (ref 100–108)
CO2 SERPL-SCNC: 23 MMOL/L (ref 21–32)
CREAT SERPL-MCNC: 0.92 MG/DL (ref 0.6–1.3)
GFR SERPL CREATININE-BSD FRML MDRD: 89 ML/MIN/1.73SQ M
GLUCOSE SERPL-MCNC: 117 MG/DL (ref 65–140)
MAGNESIUM SERPL-MCNC: 1.5 MG/DL (ref 1.6–2.6)
POTASSIUM SERPL-SCNC: 3.5 MMOL/L (ref 3.5–5.3)
PROT SERPL-MCNC: 6.7 G/DL (ref 6.4–8.2)
SODIUM SERPL-SCNC: 139 MMOL/L (ref 136–145)

## 2018-08-17 PROCEDURE — 80053 COMPREHEN METABOLIC PANEL: CPT | Performed by: PHYSICIAN ASSISTANT

## 2018-08-17 PROCEDURE — 99254 IP/OBS CNSLTJ NEW/EST MOD 60: CPT | Performed by: INTERNAL MEDICINE

## 2018-08-17 PROCEDURE — 83735 ASSAY OF MAGNESIUM: CPT | Performed by: PHYSICIAN ASSISTANT

## 2018-08-17 PROCEDURE — 99232 SBSQ HOSP IP/OBS MODERATE 35: CPT | Performed by: INTERNAL MEDICINE

## 2018-08-17 RX ORDER — MAGNESIUM SULFATE HEPTAHYDRATE 40 MG/ML
2 INJECTION, SOLUTION INTRAVENOUS ONCE
Status: COMPLETED | OUTPATIENT
Start: 2018-08-17 | End: 2018-08-17

## 2018-08-17 RX ADMIN — Medication 100 MG: at 08:18

## 2018-08-17 RX ADMIN — ASPIRIN 81 MG: 81 TABLET, COATED ORAL at 08:18

## 2018-08-17 RX ADMIN — HEPARIN SODIUM 5000 UNITS: 5000 INJECTION, SOLUTION INTRAVENOUS; SUBCUTANEOUS at 05:12

## 2018-08-17 RX ADMIN — ATORVASTATIN CALCIUM 10 MG: 10 TABLET, FILM COATED ORAL at 08:18

## 2018-08-17 RX ADMIN — HEPARIN SODIUM 5000 UNITS: 5000 INJECTION, SOLUTION INTRAVENOUS; SUBCUTANEOUS at 22:10

## 2018-08-17 RX ADMIN — CEFEPIME HYDROCHLORIDE 2000 MG: 2 INJECTION, POWDER, FOR SOLUTION INTRAVENOUS at 08:22

## 2018-08-17 RX ADMIN — MAGNESIUM SULFATE HEPTAHYDRATE 2 G: 40 INJECTION, SOLUTION INTRAVENOUS at 10:01

## 2018-08-17 RX ADMIN — FOLIC ACID 1 MG: 1 TABLET ORAL at 08:18

## 2018-08-17 RX ADMIN — Medication 1 TABLET: at 08:18

## 2018-08-17 RX ADMIN — LISINOPRIL 20 MG: 20 TABLET ORAL at 08:18

## 2018-08-17 RX ADMIN — SODIUM CHLORIDE 75 ML/HR: 0.9 INJECTION, SOLUTION INTRAVENOUS at 11:38

## 2018-08-17 RX ADMIN — HEPARIN SODIUM 5000 UNITS: 5000 INJECTION, SOLUTION INTRAVENOUS; SUBCUTANEOUS at 14:19

## 2018-08-17 RX ADMIN — AMLODIPINE BESYLATE 10 MG: 10 TABLET ORAL at 08:18

## 2018-08-17 RX ADMIN — CEFTRIAXONE SODIUM 1000 MG: 10 INJECTION, POWDER, FOR SOLUTION INTRAVENOUS at 20:09

## 2018-08-17 NOTE — CONSULTS
Consultation - Infectious Disease   Tiffani Munguia 65 y o  male MRN: [id number]  Unit/Bed#: -01 Encounter: [id number]      IMPRESSION & RECOMMENDATIONS:   Impression: This is a 65 y o  male, otherwise healthy, was seen in the ER on 08/13/2020 4 hr after handling unhealthy raw clams  He is clinically much improved with 1 dose IV ceftriaxone  However, his blood cultures obtained on 08/13 are now positive for GNR  1   Bacteremia  Both blood culture sets obtained on age 16 are now positive for curved GNR  Given exposure to unhealthy and raw clam, followed by gastroenteritis symptoms, I suspect this is Campylobacter enterocolitis with secondary bacteremia  Patient is already clinically much improved with 1 dose IV ceftriaxone given the ER  I would transition back to IV ceftriaxone  Will wait for final confirmation of blood culture result  Hopefully, when culture results are back, patient can be discharged home on p o  ciprofloxacin versus azithromycin  Change antibiotic to IV ceftriaxone  Follow-up on final ID of curved GNR in initial blood cultures  Follow-up on repeat blood cultures  Doubt that these are positive, given resolution of symptoms  Anticipate transition to p o  antibiotic in next 24 hr, when final initial blood culture results are back  Depending on final ID of GNR, will probably discharge patient on ciprofloxacin versus azithromycin  2   Enterocolitis  This came on after exposure to unhealthy and raw clam   Symptoms have resolved  No further antibiotic is needed for this  However, given bacteremia above, he will need antibiotic, as in above  No further antibiotic needed for this  However, antibiotic for bacteremia, as in above  ER records reviewed in detail  Discussed with patient in detail regarding above plan  Discussed with Dr Emily Pringle from [facility] service  Thank you for this consultation  We will follow along with you      HISTORY OF PRESENT ILLNESS:  Reason for Consult: Bacteremia  HPI: Octavio Arzate is a 65 y o  male, otherwise healthy, came to the ER here on 08/13 with fever/chills and diarrhea  Patient did not have leukocytosis and had benign exam   He was given 1 dose IV ceftriaxone and discharged home for presumed viral gastroenteritis  Blood cultures were obtained  Yesterday, patient was called to come back to the ER for admission due to blood cultures growing GNR  On return to the ER, patient was already feeling well, with no further fever/chills or diarrhea  Regardless, he was admitted  IV cefepime was started  We are asked to evaluate the patient  The day prior to presentation to the ER, patient was preparing clams at home  He bought the clams at [facility]  He states that 1 clam looked unhealthy  He discarded and boil all the others  Later that evening, patient started having the above symptoms, mild nausea, severe diarrhea and fever/chills  After his visit to the ER on 08/13, fever resolved in 48 hr   Diarrhea took another 24 hr after that to resolved  At present, he feels well, back to normal self  None of his family members, including his wife and father, is ill  Patient denies any undercooked meat or seafood  REVIEW OF SYSTEMS:  A complete 12 point system-based review was done  Except for what is noted in HPI above, ROS of systems is otherwise negative  PAST MEDICAL HISTORY:  Past Medical History:   Diagnosis Date    HLD (hyperlipidemia)     HTN (hypertension)      Past Surgical History:   Procedure Laterality Date    HERNIA REPAIR      20 years ago     Problem list reviewed  FAMILY HISTORY:  Non-contributory    SOCIAL HISTORY:  History   Alcohol Use    Yes     Comment: daily 2-4 anisha     History   Drug Use No     History   Smoking Status    Never Smoker   Smokeless Tobacco    Never Used       ALLERGIES:  No Known Allergies    MEDICATIONS:  All current active medications have been reviewed    Patient is currently on IV cefepime  PHYSICAL EXAM:  Vitals:  HR:  [51-72] 51  Resp:  [18] 18  BP: (151-162)/(81-97) 155/86  SpO2:  [94 %-97 %] 94 %  Temp (24hrs), Av 7 °F (37 1 °C), Min:98 3 °F (36 8 °C), Max:99 2 °F (37 3 °C)  Current: Temperature: 98 7 °F (37 1 °C)     Physical Exam:  General:  Well-nourished, well-developed, in no acute distress  Awake, alert and oriented x 3  Eyes:  Conjunctive clear with no hemorrhages or effusions  Oropharynx:  No ulcers, no lesions, pharynx benign, no tonsillitis  Neck:  Supple, no lymphadenopathy, no mass, nontender  Lungs:  Expansion symmetric, no rales, no wheezing, no accessory muscle use  Cardiac:  Regular rate and rhythm, normal S1, normal S2, no murmurs  Abdomen:  Soft, nondistended, non-tender, no HSM  Extremities:  No edema, no erythema, nontender  No ulcers  Skin:  No rashes, no ulcers  Neurological:  Moves all four extremities spontaneously, sensation grossly intact    LABS, IMAGING, & OTHER STUDIES:  Lab Results:  I have personally reviewed pertinent labs  Results from last 7 days  Lab Units 18   SODIUM mmol/L 139 141 132*   POTASSIUM mmol/L 3 5 3 5 3 6   CHLORIDE mmol/L 108 106 96*   CO2 mmol/L 23 29 27   ANION GAP mmol/L 8 6 9   BUN mg/dL 17 17 14   CREATININE mg/dL 0 92 1 05 1 13   EGFR ml/min/1 73sq m 89 76 70   GLUCOSE RANDOM mg/dL 117 103 126   CALCIUM mg/dL 8 6 9 2 9 4   AST U/L 29  --  25   ALT U/L 33  --  29   ALK PHOS U/L 47  --  61   TOTAL PROTEIN g/dL 6 7  --  7 9   BILIRUBIN TOTAL mg/dL 0 30  --  0 50       Results from last 7 days  Lab Units 181   WBC Thousand/uL 5 55 6 64   HEMOGLOBIN g/dL 13 4 14 1   PLATELETS Thousands/uL 178 143*       Results from last 7 days  Lab Units 18   GRAM STAIN RESULT  Curved gram negative rods Curved gram negative rods       Imaging Studies:   I have personally reviewed pertinent imaging study reports and images in PACS    CXR reviewed personally  No infiltrates or consolidations  EKG, Pathology, and Other Studies:   I have personally reviewed pertinent reports

## 2018-08-17 NOTE — CASE MANAGEMENT
Notification of Inpatient Admission/Inpatient Authorization Request  This is a Notification of Inpatient Admission/Request for Inpatient Authorization for our facility [facility]  Please be advised that this patient is currently in our facility under Inpatient Status  Below you will find the Attending Physician and Facilitys information including NPI#  and contact information for the Utilization Review Department where the patient is receiving care services  Place of Service Code: 24   Place of Service Name: Inpatient Hospital  Presentation Date & Time: 8/16/2018  6:09 PM  Inpatient Admission Date & Time: 8/16/18 2005  Discharge Date & Time: No discharge date for patient encounter  Discharge Disposition (if discharged): Home/Self Care(Disregard as member is still in house)  Admission Orders:   Admission Orders     Ordered        08/16/18 2005  Inpatient Admission (expected length of stay for this patient is greater than two midnights)  Once             Attending Physician:  JELENA Bryant  [facility] [place] Practioner ID- [id number]  [address]  Phone [phone]: [phone]  Fax: [phone]  Facility: [facility]  Address: [address] [place] [address]    Phone: [phone]  Tax ID [id number]  NPI [id number]  Medicare: [id number]    Thank you,  [address] [place] [address] Utilization Review Department  Phone: [phone]; Fax [phone]  ATTENTION: Please call with any questions or concerns to [phone]  and carefully follow the prompts so that you are directed to the right person  Send all requests for admission clinical reviews, approved or denied determinations and any other requests to fax [phone]   All voicemails are confidential

## 2018-08-17 NOTE — CASE MANAGEMENT
Initial Clinical Review    Admission: Date/Time/Statement: 8/16/18 @ 2005     Orders Placed This Encounter   Procedures    Inpatient Admission (expected length of stay for this patient is greater than two midnights)     Standing Status:   Standing     Number of Occurrences:   1     Order Specific Question:   Admitting Physician     Answer:   Merlin Ortega     Order Specific Question:   Level of Care     Answer:   Med Surg [16]     Order Specific Question:   Estimated length of stay     Answer:   More than 2 Midnights     Order Specific Question:   Certification     Answer:   I certify that inpatient services are medically necessary for this patient for a duration of greater than two midnights  See H&P and MD Progress Notes for additional information about the patient's course of treatment  ED: Date/Time/Mode of Arrival:   ED Arrival Information     Expected Arrival Acuity Means of Arrival Escorted By Service Admission Type    - 8/16/2018 17:03 Urgent Walk-In Self General Medicine Urgent    Arrival Complaint    Abnormal Lab          Chief Complaint:   Chief Complaint   Patient presents with    Abnormal Lab     pt called to come back to ed for positive blood cultures after being seen in ED sunday for fevers unrelieved by tylenol and diarrhea  History of Illness: 65 y o  male who presents with positive blood cultures  He has past medical history of hypertension, hyperlipidemia  He was noted to be seen in the ER on 8/13/2018 secondary to worsening fever  He reports that on Sunday afternoon he was noted to eat clams  One appeared dead  He then cooked the clams  He has worked in the [employer] for several years and they did not appear to be abnormal     In the evening he began having a fever, myalgias and diarrhea  He was noted to be taking aspirin and Tylenol for his fever without improvement    He had reported at that time that his granddaughter had a cold the week before and that was his only sick contact  He was noted to have had a couple episodes of diarrhea  His fever was as high as 103  He came into the emergency department given he was having shaking chills and a fever of 103 1 at home  He was given a dose of Rocephin however diagnosed with a viral infection when results came back negative and he was noted to be discharged home  Initially on arrival he had a temperature of 102 9°  He reports he was discharged home, then began taking Motrin and Tylenol  His fever resolved on Wednesday morning at approximately 1:30 a m  and did not recur  He reports that he had persistent diarrhea every 30 minutes on Monday and Tuesday  It was noted to be bright green in nature  He saw his PCP yesterday who told him that diarrhea which resolved  He took 2 over-the-counter antidiarrheal medications yesterday  He did not have any diarrhea since yesterday afternoon or any today  He denies any further abdominal pain reports this has resolved  He did not take any of his blood pressure medicines or medications since Sunday      Blood cultures were obtained at that time were noted to return positive for curved gram-negative rods therefore the patient was called back into the emergency department this evening  In the ER, patient case was discussed with Infectious Disease who recommended cefepime until final culture results obtained      ED Vital Signs:   ED Triage Vitals   Temperature Pulse Respirations Blood Pressure SpO2   08/16/18 1741 08/16/18 1741 08/16/18 1741 08/16/18 1741 08/16/18 1741   98 3 °F (36 8 °C) 70 18 152/81 97 %      Temp Source Heart Rate Source Patient Position - Orthostatic VS BP Location FiO2 (%)   08/16/18 1741 08/16/18 1741 08/16/18 1741 08/16/18 1741 --   Oral Monitor Sitting Right arm       Pain Score       08/16/18 2000       No Pain        Wt Readings from Last 1 Encounters:   08/16/18 98 6 kg (217 lb 6 oz)       Vital Signs (abnormal): maximum temperature 99 2     Abnormal Labs/Diagnostic Test Results:   Blood culture #2 [[id number]] Collected: 08/13/18 2213   Lab Status: Preliminary result Specimen: Blood from Arm, Left Updated: 08/17/18 1038    Gram Stain Result Curved gram negative rods   Blood culture #1 [[id number]] Collected: 08/13/18 2211   Lab Status: Preliminary result Specimen: Blood from Arm, Right Updated: 08/17/18 1037    Gram Stain Result Curved gram negative rods       Wbc 5 55  UA trace protein  Small bilirubin  Trace blood  8/17/2018-  Albumin 2 9  Magnesium 1 5  ED Treatment: blood cultures  Medication Administration from 08/16/2018 1703 to 08/16/2018 2110       Date/Time Order Dose Route Action Comments     08/16/2018 1903 sodium chloride 0 9 % bolus 1,000 mL 1,000 mL Intravenous New Bag      08/16/2018 1959 cefepime (MAXIPIME) 2 g/50 mL dextrose IVPB 2,000 mg Intravenous New Bag           Past Medical/Surgical History: Active Ambulatory Problems     Diagnosis Date Noted    No Active Ambulatory Problems     Resolved Ambulatory Problems     Diagnosis Date Noted    No Resolved Ambulatory Problems     Past Medical History:   Diagnosis Date    HLD (hyperlipidemia)     HTN (hypertension)        Admitting Diagnosis: Gram-negative bacteremia [R78 81]  Abnormal laboratory test result [R89 9]    Age/Sex: 65 y o  male    Assessment/Plan:   Gram-negative bacteremia   Assessment & Plan     · Blood cultures were noted to return positive for curved gram-negative rods  · Infectious Disease was contacted in the ER and discussed with ER physician  They recommended the use of cefepime at this time until further results obtained  However they suspect the given clinical scenario this could be vibrio versus Campylobacter  · Continue cefepime at this time, pending final cultures will consider addition of doxycycline versus transition to fluoroquinolones  · Will need 2 weeks of antibiotics    · [place] to be source from gastroenteritis, was noted to eat clams on Sunday prior to onset of symptoms  · Obtain stool studies if able to  · No fever at this time  Continue to monitor WBC, temperature curve  Last fever was noted to be on Wednesday morning  · No further diarrhea- did take immodium  Hold on CT imaging of abdomen at this time, but if symptoms recur would consider imaging            Gastroenteritis   Assessment & Plan     · Patient was noted to be clams on Sunday afternoon and by evening he was noted to have fever, myalgias  By Monday evening the patient was noted to have shaking chills  Temperature increased to 103  1  · Fever resolved by Wednesday morning  · No diarrhea since yesterday  · Did take 2 diarrhea tablets yesterday  · Tolerating oral intake  No abdominal pain currently  · Hold on imaging at this time  · Obtain stool studies           HTN (hypertension)   Assessment & Plan     · Patient is not taking blood pressure medicine since Sunday  Resume Norvasc, benazepril however hold hydrochlorothiazide  · Add hold parameters  · Blood pressure currently 162/97          HLD (hyperlipidemia)   Assessment & Plan     · Continue statin          Alcohol abuse   Assessment & Plan     · Patient drinks approximately 4 glasses of wine per night  He had 1 glass this evening  Prior to this he had not had any since Sunday  He denies any history of withdrawal   · CIWA, thiamine, folic acid  · No signs of withdrawal currently           Admission Orders:  8/16/2018 2005 INPATIENT   Scheduled Meds:   Current Facility-Administered Medications:  acetaminophen 650 mg Oral Q6H PRN    amLODIPine 10 mg Oral Daily    aspirin 81 mg Oral Daily    atorvastatin 10 mg Oral Daily    cefTRIAXone 1,000 mg Intravenous M77I    folic acid 1 mg Oral Daily    heparin (porcine) 5,000 Units Subcutaneous Q8H [address]    lisinopril 20 mg Oral Daily    magnesium sulfate 2 g Intravenous Once    multivitamin-minerals 1 tablet Oral Daily    ondansetron 4 mg Intravenous Q6H PRN    sodium chloride 75 mL/hr Intravenous Continuous Last Rate: 75 mL/hr (08/16/18 2231)   thiamine 100 mg Oral Daily      Continuous Infusions:   sodium chloride 75 mL/hr Last Rate: 75 mL/hr (08/16/18 2231)     PRN Meds:   acetaminophen    ondansetron     OTHER ORDERS:  CIWA  Protocol   scds  Stool record at bedside  Stool enteric bacterial panel     Thank you,  [address]  Utilization Review Department  Phone: [phone]; Fax [phone]  ATTENTION: Please call with any questions or concerns to [phone]  and carefully follow the prompts so that you are directed to the right person  Send all requests for admission clinical reviews, approved or denied determinations and any other requests to fax [phone]   All voicemails are confidential

## 2018-08-17 NOTE — ASSESSMENT & PLAN NOTE
· Patient was noted to be clams on Sunday afternoon and by evening he was noted to have fever, myalgias  By Monday evening the patient was noted to have shaking chills  Temperature increased to 103  1  · Fever resolved by Wednesday morning  · No diarrhea since yesterday  · Did take 2 diarrhea tablets yesterday  · Tolerating oral intake  No abdominal pain currently  · Hold on imaging at this time     · Obtain stool studies

## 2018-08-17 NOTE — ASSESSMENT & PLAN NOTE
Patient was noted to be clams on Sunday afternoon and by evening he was noted to have fever, myalgias  By Monday evening the patient was noted to have shaking chills  Temperature increased to 103  1  Fever resolved by Wednesday morning  No diarrhea since Wednesday  Tolerating Po intake well

## 2018-08-17 NOTE — ASSESSMENT & PLAN NOTE
· Patient is not taking blood pressure medicine since Sunday  Resume Norvasc, benazepril however hold hydrochlorothiazide    · Add hold parameters  · Blood pressure currently 162/97

## 2018-08-17 NOTE — ASSESSMENT & PLAN NOTE
If GNR are Campylobacter then DC on Azithromycin, If Vibrio then Doxycylcine  Otherwise Cipro  Discussed above with ID  Patient can be discharge when ID &S are back  As of this PM not yet finalized, just reported as GNR curved

## 2018-08-17 NOTE — ASSESSMENT & PLAN NOTE
· Blood cultures were noted to return positive for curved gram-negative rods  · Infectious Disease was contacted in the ER and discussed with ER physician  They recommended the use of cefepime at this time until further results obtained  However they suspect the given clinical scenario this could be vibrio versus Campylobacter  · Continue cefepime at this time, pending final cultures will consider addition of doxycycline versus transition to fluoroquinolones  · Will need 2 weeks of antibiotics  · [place] to be source from gastroenteritis, was noted to eat clams on Sunday prior to onset of symptoms  · Obtain stool studies if able to  · No fever at this time  Continue to monitor WBC, temperature curve  Last fever was noted to be on Wednesday morning  · No further diarrhea- did take immodium  Hold on CT imaging of abdomen at this time, but if symptoms recur would consider imaging

## 2018-08-17 NOTE — PROGRESS NOTES
Progress Note - Harrison Brandt 1955, 65 y o  male MRN: [id number]    Unit/Bed#: -01 Encounter: [id number]    Primary Care Provider: Annalisa Vera MD   Date and time admitted to hospital: 8/16/2018  6:09 PM        Gastroenteritis   Assessment & Plan    Patient was noted to be clams on Sunday afternoon and by evening he was noted to have fever, myalgias  By Monday evening the patient was noted to have shaking chills  Temperature increased to 103  1  Fever resolved by Wednesday morning  No diarrhea since Wednesday  Tolerating Po intake well  Alcohol abuse   Assessment & Plan    Patient drinks approximately 4 glasses of wine per night  He had 1 glass this evening  Prior to this he had not had any since Sunday  He denies any history of withdrawal   CIWA, thiamine, folic acid  No signs of withdrawal currently  HLD (hyperlipidemia)   Assessment & Plan    Continue statin        HTN (hypertension)   Assessment & Plan    BP is 132/87  Will continue to monitor  * Gram-negative bacteremia   Assessment & Plan    If GNR are Campylobacter then DC on Azithromycin, If Vibrio then Doxycylcine  Otherwise Cipro  Discussed above with ID  Patient can be discharge when ID &S are back  As of this PM not yet finalized, just reported as GNR curved  VTE Pharmacologic Prophylaxis:   Pharmacologic: Heparin  Mechanical VTE Prophylaxis in Place: Yes    Patient Centered Rounds: I have performed bedside rounds with nursing staff today  Discussions with Specialists or Other Care Team Provider: Discussed with ID  Education and Discussions with Family / Patient: Discussed with patient  Time Spent for Care: 30 minutes  More than 50% of total time spent on counseling and coordination of care as described above      Current Length of Stay: 1 day(s)    Current Patient Status: Inpatient   Certification Statement: The patient will continue to require additional inpatient hospital stay due to IV ABx  Discharge Plan: Medically stable once ID & S are back  Code Status: Level 1 - Full Code      Subjective:   Patient seen and examined     " I feel fine"     Hoping to go home   Objective:     Vitals:   Temp (24hrs), Av 7 °F (37 1 °C), Min:98 3 °F (36 8 °C), Max:99 2 °F (37 3 °C)    HR:  [51-72] 60  Resp:  [18] 18  BP: (132-162)/(81-97) 132/87  SpO2:  [94 %-97 %] 97 %  Body mass index is 27 91 kg/m²  Input and Output Summary (last 24 hours): Intake/Output Summary (Last 24 hours) at 18 1443  Last data filed at 18 1619   Gross per 24 hour   Intake                0 ml   Output              850 ml   Net             -850 ml       Physical Exam:     Physical Exam   Constitutional: He is oriented to person, place, and time  He appears well-developed and well-nourished  No distress  HENT:   Head: Normocephalic and atraumatic  Mouth/Throat: No oropharyngeal exudate  Neck: No JVD present  No tracheal deviation present  No thyromegaly present  Cardiovascular: Normal rate and regular rhythm  Exam reveals no gallop and no friction rub  No murmur heard  Pulmonary/Chest: Effort normal and breath sounds normal  No respiratory distress  He has no wheezes  He has no rales  He exhibits no tenderness  Abdominal: Soft  Bowel sounds are normal  He exhibits no distension and no mass  There is no tenderness  There is no rebound and no guarding  Musculoskeletal: Normal range of motion  He exhibits no edema, tenderness or deformity  Lymphadenopathy:     He has no cervical adenopathy  Neurological: He is alert and oriented to person, place, and time  He displays normal reflexes  No cranial nerve deficit  He exhibits normal muscle tone  Coordination normal    Skin: Skin is warm  No rash noted  He is not diaphoretic  No erythema  No pallor  Psychiatric: He has a normal mood and affect           Additional Data:     Labs:      Results from last 7 days  Lab Units 08/16/18  1902   WBC Thousand/uL 5 55   HEMOGLOBIN g/dL 13 4   HEMATOCRIT % 39 2   PLATELETS Thousands/uL 178   NEUTROS PCT % 54   LYMPHS PCT % 26   MONOS PCT % 17*   EOS PCT % 2       Results from last 7 days  Lab Units 08/17/18  0518   SODIUM mmol/L 139   POTASSIUM mmol/L 3 5   CHLORIDE mmol/L 108   CO2 mmol/L 23   BUN mg/dL 17   CREATININE mg/dL 0 92   CALCIUM mg/dL 8 6   TOTAL PROTEIN g/dL 6 7   BILIRUBIN TOTAL mg/dL 0 30   ALK PHOS U/L 47   ALT U/L 33   AST U/L 29   GLUCOSE RANDOM mg/dL 117       Results from last 7 days  Lab Units 08/13/18  2212   INR  1 02                 * I Have Reviewed All Lab Data Listed Above  * Additional Pertinent Lab Tests Reviewed: All Labs For Current Hospital Admission Reviewed    Imaging:    Imaging Reports Reviewed Today Include:   CXR -   "No acute cardiopulmonary disease "  Imaging Personally Reviewed by Myself Includes:  As above       Recent Cultures (last 7 days):       Results from last 7 days  Lab Units 08/13/18  2213 08/13/18  2211   GRAM STAIN RESULT  Curved gram negative rods Curved gram negative rods       Last 24 Hours Medication List:     Current Facility-Administered Medications:  acetaminophen 650 mg Oral Q6H PRN Francie Diasio, PA-C    amLODIPine 10 mg Oral Daily Francie Diasio, PA-C    aspirin 81 mg Oral Daily Francie Diasio, PA-C    atorvastatin 10 mg Oral Daily Francie Diasio, PA-C    cefTRIAXone 1,000 mg Intravenous Q24H Candace Mark MD    folic acid 1 mg Oral Daily Francie Diasio, PA-C    heparin (porcine) 5,000 Units Subcutaneous Q8H [address] Francie Diasio, PA-C    lisinopril 20 mg Oral Daily Francie Diasio, PA-C    multivitamin-minerals 1 tablet Oral Daily Francie Diasio, PA-C    ondansetron 4 mg Intravenous Q6H PRN Francie Diajaio, PA-C    sodium chloride 75 mL/hr Intravenous Continuous Francie Diajaio, PA-C Last Rate: 75 mL/hr (08/17/18 1138)   thiamine 100 mg Oral Daily Anthony Malik PA-C         Today, Patient Was Seen By: Saeid Coburn MD    ** Please Note: Dictation voice to text software may have been used in the creation of this document   **

## 2018-08-17 NOTE — ASSESSMENT & PLAN NOTE
Patient drinks approximately 4 glasses of wine per night  He had 1 glass this evening  Prior to this he had not had any since Sunday  He denies any history of withdrawal   CIWA, thiamine, folic acid  No signs of withdrawal currently

## 2018-08-17 NOTE — PROGRESS NOTES
Pt sitting comfortably in the chair  Pt has no complaints of pain and is not in distress  Call bell within reach  Will continue to monitor

## 2018-08-17 NOTE — H&P
[address] Internal medicine  H&P- Abdoulaye Mckeon 1955, 65 y o  male MRN: [id number]  Unit/Bed#: PAM Encounter: [id number]  Primary Care Provider: Isaias Bernard MD   Date and time admitted to hospital: 8/16/2018  6:09 PM      * Gram-negative bacteremia   Assessment & Plan    · Blood cultures were noted to return positive for curved gram-negative rods  · Infectious Disease was contacted in the ER and discussed with ER physician  They recommended the use of cefepime at this time until further results obtained  However they suspect the given clinical scenario this could be vibrio versus Campylobacter  · Continue cefepime at this time, pending final cultures will consider addition of doxycycline versus transition to fluoroquinolones  · Will need 2 weeks of antibiotics  · [place] to be source from gastroenteritis, was noted to eat clams on Sunday prior to onset of symptoms  · Obtain stool studies if able to  · No fever at this time  Continue to monitor WBC, temperature curve  Last fever was noted to be on Wednesday morning  · No further diarrhea- did take immodium  Hold on CT imaging of abdomen at this time, but if symptoms recur would consider imaging  Gastroenteritis   Assessment & Plan    · Patient was noted to be clams on Sunday afternoon and by evening he was noted to have fever, myalgias  By Monday evening the patient was noted to have shaking chills  Temperature increased to 103  1  · Fever resolved by Wednesday morning  · No diarrhea since yesterday  · Did take 2 diarrhea tablets yesterday  · Tolerating oral intake  No abdominal pain currently  · Hold on imaging at this time  · Obtain stool studies         HTN (hypertension)   Assessment & Plan    · Patient is not taking blood pressure medicine since Sunday  Resume Norvasc, benazepril however hold hydrochlorothiazide    · Add hold parameters  · Blood pressure currently 162/97        HLD (hyperlipidemia)   Assessment & Plan    · Continue statin        Alcohol abuse   Assessment & Plan    · Patient drinks approximately 4 glasses of wine per night  He had 1 glass this evening  Prior to this he had not had any since Sunday  He denies any history of withdrawal   · CIWA, thiamine, folic acid  · No signs of withdrawal currently  VTE Prophylaxis: Heparin  / sequential compression device   Code Status: FULL CODE  POLST: There is no POLST form on file for this patient (pre-hospital)  Discussion with family: patient    Anticipated Length of Stay:  Patient will be admitted on an Inpatient basis with an anticipated length of stay of  > 2 midnights  Justification for Hospital Stay: bacteremia    Total Time for Visit, including Counseling / Coordination of Care: 1 hour  Greater than 50% of this total time spent on direct patient counseling and coordination of care  Chief Complaint:   Positive blood cultures    History of Present Illness:    Miguelina Merida is a 65 y o  male who presents with positive blood cultures  He has past medical history of hypertension, hyperlipidemia  He was noted to be seen in the ER on 8/13/2018 secondary to worsening fever  He reports that on Sunday afternoon he was noted to eat clams  One appeared dead so we throughout  He then cooked the clams  He has worked in the [employer] for several years and they did not appear to be abnormal   He bought them at [employer]  In the evening he began having a fever, myalgias and diarrhea  He was noted to be taking aspirin and Tylenol for his fever without improvement  He had reported at that time that his granddaughter had a cold the week before and that was his only sick contact  He was noted to have had a couple episodes of diarrhea  His fever was as high as 103  He came into the emergency department given he was having shaking chills and a fever of 103 1 at home    He was given a dose of Rocephin however diagnosed with a viral infection when results came back negative and he was noted to be discharged home  Initially on arrival he had a temperature of 102 9°  He reports he was discharged home, then began taking Motrin and Tylenol  His fever resolved on Wednesday morning at approximately 1:30 a m  and did not recur  He reports that he had persistent diarrhea every 30 minutes on Monday and Tuesday  It was noted to be bright green in nature  He saw his PCP yesterday who told him that diarrhea which resolved  He took 2 over-the-counter antidiarrheal medications yesterday  He did not have any diarrhea since yesterday afternoon or any today  He denies any further abdominal pain reports this has resolved  He did not take any of his blood pressure medicines or medications since Sunday  Blood cultures were obtained at that time were noted to return positive for curved gram-negative rods therefore the patient was called back into the emergency department this evening  In the ER, patient case was discussed with Infectious Disease who recommended cefepime until final culture results obtained  Review of Systems:    Review of Systems   Constitutional: Positive for activity change, appetite change, chills, fatigue and fever  HENT: Negative for trouble swallowing  Respiratory: Negative for cough, choking, chest tightness and shortness of breath  Cardiovascular: Negative for chest pain, palpitations and leg swelling  Gastrointestinal: Positive for abdominal pain, diarrhea and nausea  Negative for blood in stool, constipation and vomiting  Musculoskeletal: Positive for arthralgias and myalgias  Negative for neck pain  Skin: Negative for color change, pallor and rash  Neurological: Positive for dizziness, tremors and weakness  All other systems reviewed and are negative        Past Medical and Surgical History:     Past Medical History:   Diagnosis Date    HLD (hyperlipidemia)     HTN (hypertension)        Past Surgical History:   Procedure Laterality Date    HERNIA REPAIR         Meds/Allergies:    Prior to Admission medications    Medication Sig Start Date End Date Taking? Authorizing Provider   amLODIPine (NORVASC) 5 mg tablet Take 5 mg by mouth daily   Yes Historical Provider, MD   aspirin (ECOTRIN LOW STRENGTH) 81 mg EC tablet Take 81 mg by mouth daily   Yes Historical Provider, MD   atorvastatin (LIPITOR) 20 mg tablet Take 20 mg by mouth daily   Yes Historical Provider, MD   benazepril (LOTENSIN) 5 mg tablet Take 5 mg by mouth 2 (two) times a day   Yes Historical Provider, MD   Multiple Vitamin (MULTIVITAMIN) tablet Take 1 tablet by mouth daily   Yes Historical Provider, MD     I have reviewed home medications with patient personally  Allergies: No Known Allergies    Social History:     Marital Status: /Civil Union   Occupation: sales  Patient Pre-hospital Living Situation: at home  Patient Pre-hospital Level of Mobility: no restrictions  Patient Pre-hospital Diet Restrictions: none  Substance Use History:   History   Alcohol Use    Yes     Comment: daily 2-4 anisha     History   Smoking Status    Never Smoker   Smokeless Tobacco    Never Used     History   Drug Use No       Family History:    Family History   Problem Relation Age of Onset    Diabetes Father     Prostate cancer Brother        Physical Exam:     Vitals:   Blood Pressure: 162/97 (08/16/18 2000)  Pulse: 72 (08/16/18 2000)  Temperature: 98 3 °F (36 8 °C) (08/16/18 1741)  Temp Source: Oral (08/16/18 1741)  Respirations: 18 (08/16/18 1941)  SpO2: 97 % (08/16/18 2000)    Physical Exam      Additional Data:     Lab Results: I have personally reviewed pertinent reports          Results from last 7 days  Lab Units 08/16/18  1902   WBC Thousand/uL 5 55   HEMOGLOBIN g/dL 13 4   HEMATOCRIT % 39 2   PLATELETS Thousands/uL 178   NEUTROS PCT % 54   LYMPHS PCT % 26   MONOS PCT % 17*   EOS PCT % 2       Results from last 7 days  Lab Units 08/16/18  1902 08/13/18  2211   SODIUM mmol/L 141 132* POTASSIUM mmol/L 3 5 3 6   CHLORIDE mmol/L 106 96*   CO2 mmol/L 29 27   BUN mg/dL 17 14   CREATININE mg/dL 1 05 1 13   CALCIUM mg/dL 9 2 9 4   TOTAL PROTEIN g/dL  --  7 9   BILIRUBIN TOTAL mg/dL  --  0 50   ALK PHOS U/L  --  61   ALT U/L  --  29   AST U/L  --  25   GLUCOSE RANDOM mg/dL 103 126       Results from last 7 days  Lab Units 08/13/18  2212   INR  1 02               Imaging: I have personally reviewed pertinent reports  cxr FROM PRIOR ADMISSION NEGATIVE  EKG, Pathology, and Other Studies Reviewed on Admission:   · [facility]  / [facility] Records Reviewed: Yes     ** Please Note: This note has been constructed using a voice recognition system   **

## 2018-08-17 NOTE — ASSESSMENT & PLAN NOTE
· Patient drinks approximately 4 glasses of wine per night  He had 1 glass this evening  Prior to this he had not had any since Sunday  He denies any history of withdrawal   · CIWA, thiamine, folic acid  · No signs of withdrawal currently

## 2018-08-18 VITALS
RESPIRATION RATE: 18 BRPM | BODY MASS INDEX: 27.9 KG/M2 | HEART RATE: 49 BPM | TEMPERATURE: 98.2 F | DIASTOLIC BLOOD PRESSURE: 84 MMHG | WEIGHT: 217.37 LBS | HEIGHT: 74 IN | OXYGEN SATURATION: 96 % | SYSTOLIC BLOOD PRESSURE: 167 MMHG

## 2018-08-18 LAB
ALBUMIN SERPL BCP-MCNC: 2.9 G/DL (ref 3.5–5)
ALP SERPL-CCNC: 50 U/L (ref 46–116)
ALT SERPL W P-5'-P-CCNC: 29 U/L (ref 12–78)
ANION GAP SERPL CALCULATED.3IONS-SCNC: 10 MMOL/L (ref 4–13)
AST SERPL W P-5'-P-CCNC: 23 U/L (ref 5–45)
BACTERIA BLD CULT: ABNORMAL
BACTERIA BLD CULT: ABNORMAL
BASOPHILS # BLD MANUAL: 0 THOUSAND/UL (ref 0–0.1)
BASOPHILS NFR MAR MANUAL: 0 % (ref 0–1)
BILIRUB SERPL-MCNC: 0.2 MG/DL (ref 0.2–1)
BUN SERPL-MCNC: 14 MG/DL (ref 5–25)
CALCIUM SERPL-MCNC: 8.6 MG/DL (ref 8.3–10.1)
CHLORIDE SERPL-SCNC: 107 MMOL/L (ref 100–108)
CO2 SERPL-SCNC: 23 MMOL/L (ref 21–32)
CREAT SERPL-MCNC: 0.86 MG/DL (ref 0.6–1.3)
EOSINOPHIL # BLD MANUAL: 0.22 THOUSAND/UL (ref 0–0.4)
EOSINOPHIL NFR BLD MANUAL: 4 % (ref 0–6)
ERYTHROCYTE [DISTWIDTH] IN BLOOD BY AUTOMATED COUNT: 12.3 % (ref 11.6–15.1)
GFR SERPL CREATININE-BSD FRML MDRD: 94 ML/MIN/1.73SQ M
GLUCOSE SERPL-MCNC: 121 MG/DL (ref 65–140)
GRAM STN SPEC: ABNORMAL
GRAM STN SPEC: ABNORMAL
HCT VFR BLD AUTO: 35.7 % (ref 36.5–49.3)
HGB BLD-MCNC: 12.1 G/DL (ref 12–17)
LYMPHOCYTES # BLD AUTO: 1.81 THOUSAND/UL (ref 0.6–4.47)
LYMPHOCYTES # BLD AUTO: 33 % (ref 14–44)
MCH RBC QN AUTO: 32.8 PG (ref 26.8–34.3)
MCHC RBC AUTO-ENTMCNC: 33.9 G/DL (ref 31.4–37.4)
MCV RBC AUTO: 97 FL (ref 82–98)
METAMYELOCYTES NFR BLD MANUAL: 2 % (ref 0–1)
MONOCYTES # BLD AUTO: 1.04 THOUSAND/UL (ref 0–1.22)
MONOCYTES NFR BLD: 19 % (ref 4–12)
NEUTROPHILS # BLD MANUAL: 2.31 THOUSAND/UL (ref 1.85–7.62)
NEUTS BAND NFR BLD MANUAL: 2 % (ref 0–8)
NEUTS SEG NFR BLD AUTO: 40 % (ref 43–75)
NRBC BLD AUTO-RTO: 0 /100 WBCS
PLATELET # BLD AUTO: 188 THOUSANDS/UL (ref 149–390)
PLATELET BLD QL SMEAR: ADEQUATE
PMV BLD AUTO: 9 FL (ref 8.9–12.7)
POTASSIUM SERPL-SCNC: 3.6 MMOL/L (ref 3.5–5.3)
PROT SERPL-MCNC: 6.7 G/DL (ref 6.4–8.2)
RBC # BLD AUTO: 3.69 MILLION/UL (ref 3.88–5.62)
SODIUM SERPL-SCNC: 140 MMOL/L (ref 136–145)
TOTAL CELLS COUNTED SPEC: 100
WBC # BLD AUTO: 5.49 THOUSAND/UL (ref 4.31–10.16)

## 2018-08-18 PROCEDURE — 85027 COMPLETE CBC AUTOMATED: CPT | Performed by: INTERNAL MEDICINE

## 2018-08-18 PROCEDURE — 99239 HOSP IP/OBS DSCHRG MGMT >30: CPT | Performed by: INTERNAL MEDICINE

## 2018-08-18 PROCEDURE — 85007 BL SMEAR W/DIFF WBC COUNT: CPT | Performed by: INTERNAL MEDICINE

## 2018-08-18 PROCEDURE — 80053 COMPREHEN METABOLIC PANEL: CPT | Performed by: INTERNAL MEDICINE

## 2018-08-18 RX ORDER — ACETAMINOPHEN 325 MG/1
325 TABLET ORAL EVERY 6 HOURS PRN
Qty: 30 TABLET | Refills: 0 | Status: SHIPPED | OUTPATIENT
Start: 2018-08-18

## 2018-08-18 RX ORDER — CIPROFLOXACIN 250 MG/1
500 TABLET, FILM COATED ORAL EVERY 12 HOURS SCHEDULED
Qty: 22 TABLET | Refills: 0 | Status: SHIPPED | OUTPATIENT
Start: 2018-08-18 | End: 2018-08-29

## 2018-08-18 RX ADMIN — Medication 100 MG: at 09:21

## 2018-08-18 RX ADMIN — LISINOPRIL 20 MG: 20 TABLET ORAL at 09:20

## 2018-08-18 RX ADMIN — ASPIRIN 81 MG: 81 TABLET, COATED ORAL at 09:20

## 2018-08-18 RX ADMIN — SODIUM CHLORIDE 75 ML/HR: 0.9 INJECTION, SOLUTION INTRAVENOUS at 02:12

## 2018-08-18 RX ADMIN — Medication 1 TABLET: at 09:22

## 2018-08-18 RX ADMIN — FOLIC ACID 1 MG: 1 TABLET ORAL at 09:21

## 2018-08-18 RX ADMIN — HEPARIN SODIUM 5000 UNITS: 5000 INJECTION, SOLUTION INTRAVENOUS; SUBCUTANEOUS at 05:11

## 2018-08-18 RX ADMIN — AMLODIPINE BESYLATE 10 MG: 10 TABLET ORAL at 09:21

## 2018-08-18 NOTE — PROGRESS NOTES
IV fluids changed  Pt  Resting comfortably in bed  States he has no needs at this time, he just wants to be discharged early in the morning  Vitals stable, call bell within reach and will continue to monitor

## 2018-08-18 NOTE — NURSING NOTE
Patient up in chair, reading his book, with no pain or discomfort  Patient shows no signs of anxiety  Call bell and bedside table within reach

## 2018-08-18 NOTE — ASSESSMENT & PLAN NOTE
Patient was noted to be clams on Sunday afternoon and by evening he was noted to have fever, myalgias  By Monday evening the patient was noted to have shaking chills  Temperature increased to 103  1  Fever resolved by Wednesday morning  No diarrhea since Wednesday  Tolerating Po intake well  Afebrile and no GI symptoms throughout this hospital stay

## 2018-08-18 NOTE — DISCHARGE SUMMARY
Discharge- [facility] 1955, 65 y o  male MRN: [id number]    Unit/Bed#: -01 Encounter: [id number]    Primary Care Provider: Tiffanie Heck MD   Date and time admitted to hospital: 8/16/2018  6:09 PM        Gastroenteritis   Assessment & Plan    Patient was noted to be clams on Sunday afternoon and by evening he was noted to have fever, myalgias  By Monday evening the patient was noted to have shaking chills  Temperature increased to 103  1  Fever resolved by Wednesday morning  No diarrhea since Wednesday  Tolerating Po intake well  Afebrile and no GI symptoms throughout this hospital stay  HLD (hyperlipidemia)   Assessment & Plan    Continue statin        HTN (hypertension)   Assessment & Plan    BP is 167/84  As above, advised to follow up with PCP  May need tighter control  * Gram-negative bacteremia   Assessment & Plan    Culture came back as Campylobacter Coli  Discussed with ID  Will give Ciprofloxacin 500 mg BID for a total of 14 days of antibiotics  Will need to follow up with PCP and have lab work checked in 1 weeks time  Afebrile and repeat cultures negative to date  Discharging Physician / Practitioner: Almas Machuca MD  PCP: Tiffanie Heck MD  Admission Date:   Admission Orders     Ordered        08/16/18 2005  Inpatient Admission (expected length of stay for this patient is greater than two midnights)  Once             Discharge Date: 08/18/18    Resolved Problems  Date Reviewed: 8/18/2018    None          Consultations During Hospital Stay:  · ID - Dr Raiza Valerio  Procedures Performed:     · None   Significant Findings / Test Results:     · Blood cultures growing Campylobacter Coli     Incidental Findings:   · None   Test Results Pending at Discharge (will require follow up): · Need to follow up on sensitivities  Outpatient Tests Requested:  · None   Complications:  None   Reason for Admission:   Abnormal lab values  Hospital Course:     Boris Barker is a 65 y o  male patient who originally presented to the hospital on 8/16/2018 due to positive blood cultures from ER visit several days prior  The patient had initially presented to the emergency department on the 13th of August with fevers chills aching joints nausea decreased appetite and a temperature as high as 103°  Blood cultures were drawn, the patient was diagnosed with a possible viral illness and discharged home  Since then the patient gradually improved clinically, and by Wednesday was no longer having any symptoms and was afebrile  Two days later his blood cultures flagged as positive for gram-negative rods which were curved, and so the patient was called it into the emergency department again  At this point he was afebrile vitals were stable his laboratory work was entirely normal and he had no symptoms, however he was advised to stay in hospital and be treated with IV Rocephin, pending for final identification of organism and sensitivities  Blood cultures eventually grew Campylobacter Coli, and ID were consulted  Their recommendation was to discharge the patient on 500 mg of Cipro b i d  for total of 14 days of antibiotics  Repeat blood cultures drawn on the 16th were negative at the time of discharge  The patient likely contracted the infection 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 a set of back clams  He works in [employer], and particularly remembers handling some seafood which he did not consume, but which did not look healthy to him  The patient was also advised to follow up with his primary care physician with regard to his blood pressure and also to have his BMP and CBC checked in 1 weeks time  Please see above list of diagnoses and related plan for additional information  Condition at Discharge: stable     Discharge Day Visit / Exam:     Subjective:    Patient seen and examined  No new symptoms   No fevers or chills  "what am I waiting for ?" eager to go home       Vitals: Blood Pressure: 167/84 (08/18/18 0700)  Pulse: (!) 49 (08/18/18 0700)  Temperature: 98 2 °F (36 8 °C) (08/18/18 0700)  Temp Source: Oral (08/18/18 0700)  Respirations: 18 (08/18/18 0700)  Height: 6' 2" (188 cm) (08/16/18 2117)  Weight - Scale: 98 6 kg (217 lb 6 oz) (08/16/18 2117)  SpO2: 96 % (08/18/18 0700)  Exam:   Physical Exam   Constitutional: He is oriented to person, place, and time  He appears well-developed and well-nourished  No distress  HENT:   Head: Normocephalic  Mouth/Throat: No oropharyngeal exudate  Eyes: Right eye exhibits no discharge  Left eye exhibits no discharge  No scleral icterus  Neck: Normal range of motion  No JVD present  No tracheal deviation present  No thyromegaly present  Cardiovascular: Normal rate and regular rhythm  Exam reveals no gallop and no friction rub  No murmur heard  Pulmonary/Chest: Effort normal  No respiratory distress  He has no wheezes  He has no rales  He exhibits no tenderness  Abdominal: Soft  He exhibits no distension and no mass  There is no tenderness  There is no rebound and no guarding  Musculoskeletal: Normal range of motion  He exhibits no edema, tenderness or deformity  Lymphadenopathy:     He has no cervical adenopathy  Neurological: He is alert and oriented to person, place, and time  No cranial nerve deficit  Coordination normal    Skin: Skin is warm  No rash noted  He is not diaphoretic  No erythema  No pallor  Psychiatric: He has a normal mood and affect  Discussion with Family: Discussed with patient and called wife Kayla Tirado - updated her  Let her know about need to follow up with PCP  Discharge instructions/Information to patient and family:   See after visit summary for information provided to patient and family  Provisions for Follow-Up Care:  See after visit summary for information related to follow-up care and any pertinent home health orders  Disposition:     Home    For Discharges to [facility] SNF:   · Not Applicable to this Patient - Not Applicable to this Patient    Planned Readmission: none  Discharge Statement:  I spent 45 minutes discharging the patient  This time was spent on the day of discharge  I had direct contact with the patient on the day of discharge  Greater than 50% of the total time was spent examining patient, answering all patient questions, arranging and discussing plan of care with patient as well as directly providing post-discharge instructions  Additional time then spent on discharge activities  Discharge Medications:  See after visit summary for reconciled discharge medications provided to patient and family        ** Please Note: This note has been constructed using a voice recognition system **

## 2018-08-18 NOTE — ASSESSMENT & PLAN NOTE
Culture came back as Campylobacter Coli  Discussed with ID  Will give Ciprofloxacin 500 mg BID for a total of 14 days of antibiotics  Will need to follow up with PCP and have lab work checked in 1 weeks time  Afebrile and repeat cultures negative to date

## 2018-08-21 LAB
BACTERIA BLD CULT: NORMAL
BACTERIA BLD CULT: NORMAL

## 2018-08-21 NOTE — CASE MANAGEMENT
Notification of Discharge  This is a Notification of Discharge from our facility [address]  Please be advised that this patient has been discharge from our facility  Below you will find the admission and discharge date and time including the patients disposition  PRESENTATION DATE: 8/16/2018  6:09 PM  IP ADMISSION DATE: 8/16/18 2005  DISCHARGE DATE: 8/18/2018 12:00 PM  DISPOSITION: Home/Self Care    [address] [facility] in the [facility]  Phone: [phone]; Fax [phone]  ATTENTION: The Network Utilization Review Department is now centralized for our 9 Facilities  Make a note that we have a new phone and fax numbers for our Department  Please call with any questions or concerns to [phone] and carefully follow the prompts so that you are directed to the right person  All voicemails are confidential  Fax any determinations, approvals, denials, and requests for initial or continue stay review clinical to [phone]  Due to HIGH CALL volume, it would be easier if you could please send faxed requests to expedite your requests and in part, help us provide discharge notifications faster    Reference #[id number]

## 2019-01-29 ENCOUNTER — TRANSCRIBE ORDERS (OUTPATIENT)
Dept: LAB | Facility: CLINIC | Age: 64
End: 2019-01-29

## 2019-01-29 ENCOUNTER — APPOINTMENT (OUTPATIENT)
Dept: LAB | Facility: CLINIC | Age: 64
End: 2019-01-29
Payer: COMMERCIAL

## 2019-01-29 DIAGNOSIS — Z12.5 SPECIAL SCREENING FOR MALIGNANT NEOPLASM OF PROSTATE: ICD-10-CM

## 2019-01-29 DIAGNOSIS — R51.9 NONINTRACTABLE HEADACHE, UNSPECIFIED CHRONICITY PATTERN, UNSPECIFIED HEADACHE TYPE: ICD-10-CM

## 2019-01-29 DIAGNOSIS — Z00.00 ROUTINE GENERAL MEDICAL EXAMINATION AT A HEALTH CARE FACILITY: ICD-10-CM

## 2019-01-29 DIAGNOSIS — Z00.00 ROUTINE GENERAL MEDICAL EXAMINATION AT A HEALTH CARE FACILITY: Primary | ICD-10-CM

## 2019-01-29 LAB
ALBUMIN SERPL BCP-MCNC: 3.9 G/DL (ref 3.5–5)
ALP SERPL-CCNC: 65 U/L (ref 46–116)
ALT SERPL W P-5'-P-CCNC: 67 U/L (ref 12–78)
ANION GAP SERPL CALCULATED.3IONS-SCNC: 10 MMOL/L (ref 4–13)
AST SERPL W P-5'-P-CCNC: 32 U/L (ref 5–45)
BASOPHILS # BLD AUTO: 0.03 THOUSANDS/ΜL (ref 0–0.1)
BASOPHILS NFR BLD AUTO: 1 % (ref 0–1)
BILIRUB SERPL-MCNC: 0.6 MG/DL (ref 0.2–1)
BUN SERPL-MCNC: 22 MG/DL (ref 5–25)
CALCIUM SERPL-MCNC: 9.4 MG/DL (ref 8.3–10.1)
CHLORIDE SERPL-SCNC: 101 MMOL/L (ref 100–108)
CO2 SERPL-SCNC: 27 MMOL/L (ref 21–32)
CREAT SERPL-MCNC: 0.86 MG/DL (ref 0.6–1.3)
CRP SERPL QL: <3 MG/L
EOSINOPHIL # BLD AUTO: 0.23 THOUSAND/ΜL (ref 0–0.61)
EOSINOPHIL NFR BLD AUTO: 6 % (ref 0–6)
ERYTHROCYTE [DISTWIDTH] IN BLOOD BY AUTOMATED COUNT: 12.4 % (ref 11.6–15.1)
ERYTHROCYTE [SEDIMENTATION RATE] IN BLOOD: 12 MM/HOUR (ref 0–10)
GFR SERPL CREATININE-BSD FRML MDRD: 93 ML/MIN/1.73SQ M
GLUCOSE P FAST SERPL-MCNC: 108 MG/DL (ref 65–99)
HCT VFR BLD AUTO: 43.4 % (ref 36.5–49.3)
HGB BLD-MCNC: 14.7 G/DL (ref 12–17)
IMM GRANULOCYTES # BLD AUTO: 0.01 THOUSAND/UL (ref 0–0.2)
IMM GRANULOCYTES NFR BLD AUTO: 0 % (ref 0–2)
LYMPHOCYTES # BLD AUTO: 1.27 THOUSANDS/ΜL (ref 0.6–4.47)
LYMPHOCYTES NFR BLD AUTO: 31 % (ref 14–44)
MCH RBC QN AUTO: 32.6 PG (ref 26.8–34.3)
MCHC RBC AUTO-ENTMCNC: 33.9 G/DL (ref 31.4–37.4)
MCV RBC AUTO: 96 FL (ref 82–98)
MONOCYTES # BLD AUTO: 0.52 THOUSAND/ΜL (ref 0.17–1.22)
MONOCYTES NFR BLD AUTO: 13 % (ref 4–12)
NEUTROPHILS # BLD AUTO: 2.09 THOUSANDS/ΜL (ref 1.85–7.62)
NEUTS SEG NFR BLD AUTO: 49 % (ref 43–75)
NRBC BLD AUTO-RTO: 0 /100 WBCS
PLATELET # BLD AUTO: 174 THOUSANDS/UL (ref 149–390)
PMV BLD AUTO: 9.6 FL (ref 8.9–12.7)
POTASSIUM SERPL-SCNC: 3.9 MMOL/L (ref 3.5–5.3)
PROT SERPL-MCNC: 7.8 G/DL (ref 6.4–8.2)
PSA SERPL-MCNC: 1.3 NG/ML (ref 0–4)
RBC # BLD AUTO: 4.51 MILLION/UL (ref 3.88–5.62)
SODIUM SERPL-SCNC: 138 MMOL/L (ref 136–145)
WBC # BLD AUTO: 4.15 THOUSAND/UL (ref 4.31–10.16)

## 2019-01-29 PROCEDURE — 80053 COMPREHEN METABOLIC PANEL: CPT

## 2019-01-29 PROCEDURE — 84153 ASSAY OF PSA TOTAL: CPT

## 2019-01-29 PROCEDURE — 86618 LYME DISEASE ANTIBODY: CPT

## 2019-01-29 PROCEDURE — 86140 C-REACTIVE PROTEIN: CPT

## 2019-01-29 PROCEDURE — 85025 COMPLETE CBC W/AUTO DIFF WBC: CPT

## 2019-01-29 PROCEDURE — 36415 COLL VENOUS BLD VENIPUNCTURE: CPT

## 2019-01-29 PROCEDURE — 85652 RBC SED RATE AUTOMATED: CPT

## 2019-01-31 LAB
B BURGDOR IGG SER IA-ACNC: 0.25
B BURGDOR IGM SER IA-ACNC: 0.53

## 2019-02-26 PROCEDURE — 88305 TISSUE EXAM BY PATHOLOGIST: CPT | Performed by: PATHOLOGY

## 2019-02-27 ENCOUNTER — LAB REQUISITION (OUTPATIENT)
Dept: LAB | Facility: HOSPITAL | Age: 64
End: 2019-02-27
Payer: COMMERCIAL

## 2019-02-27 DIAGNOSIS — D12.3 BENIGN NEOPLASM OF TRANSVERSE COLON: ICD-10-CM

## 2019-02-27 DIAGNOSIS — Z86.010 HISTORY OF COLONIC POLYPS: ICD-10-CM

## 2021-02-05 ENCOUNTER — APPOINTMENT (OUTPATIENT)
Dept: LAB | Facility: CLINIC | Age: 66
End: 2021-02-05
Payer: COMMERCIAL

## 2021-02-05 ENCOUNTER — TRANSCRIBE ORDERS (OUTPATIENT)
Dept: LAB | Facility: CLINIC | Age: 66
End: 2021-02-05

## 2021-02-05 DIAGNOSIS — Z00.00 ROUTINE GENERAL MEDICAL EXAMINATION AT A HEALTH CARE FACILITY: ICD-10-CM

## 2021-02-05 DIAGNOSIS — Z12.5 SPECIAL SCREENING FOR MALIGNANT NEOPLASM OF PROSTATE: ICD-10-CM

## 2021-02-05 DIAGNOSIS — I10 ESSENTIAL HYPERTENSION: ICD-10-CM

## 2021-02-05 DIAGNOSIS — E78.5 HYPERLIPIDEMIA, UNSPECIFIED HYPERLIPIDEMIA TYPE: ICD-10-CM

## 2021-02-05 DIAGNOSIS — I10 ESSENTIAL HYPERTENSION: Primary | ICD-10-CM

## 2021-02-05 LAB
ALBUMIN SERPL BCP-MCNC: 2.3 G/DL (ref 3.5–5)
ALP SERPL-CCNC: 60 U/L (ref 46–116)
ALT SERPL W P-5'-P-CCNC: 41 U/L (ref 12–78)
ANION GAP SERPL CALCULATED.3IONS-SCNC: 9 MMOL/L (ref 4–13)
AST SERPL W P-5'-P-CCNC: 34 U/L (ref 5–45)
BASOPHILS # BLD AUTO: 0.04 THOUSANDS/ΜL (ref 0–0.1)
BASOPHILS NFR BLD AUTO: 1 % (ref 0–1)
BILIRUB SERPL-MCNC: 0.44 MG/DL (ref 0.2–1)
BUN SERPL-MCNC: 12 MG/DL (ref 5–25)
CALCIUM ALBUM COR SERPL-MCNC: 10.2 MG/DL (ref 8.3–10.1)
CALCIUM SERPL-MCNC: 8.8 MG/DL (ref 8.3–10.1)
CHLORIDE SERPL-SCNC: 103 MMOL/L (ref 100–108)
CHOLEST SERPL-MCNC: 179 MG/DL (ref 50–200)
CO2 SERPL-SCNC: 27 MMOL/L (ref 21–32)
CREAT SERPL-MCNC: 0.78 MG/DL (ref 0.6–1.3)
EOSINOPHIL # BLD AUTO: 0.26 THOUSAND/ΜL (ref 0–0.61)
EOSINOPHIL NFR BLD AUTO: 7 % (ref 0–6)
ERYTHROCYTE [DISTWIDTH] IN BLOOD BY AUTOMATED COUNT: 12.5 % (ref 11.6–15.1)
GFR SERPL CREATININE-BSD FRML MDRD: 95 ML/MIN/1.73SQ M
GLUCOSE P FAST SERPL-MCNC: 97 MG/DL (ref 65–99)
HCT VFR BLD AUTO: 43.4 % (ref 36.5–49.3)
HDLC SERPL-MCNC: 94 MG/DL
HGB BLD-MCNC: 14.4 G/DL (ref 12–17)
IMM GRANULOCYTES # BLD AUTO: 0.02 THOUSAND/UL (ref 0–0.2)
IMM GRANULOCYTES NFR BLD AUTO: 1 % (ref 0–2)
LDLC SERPL CALC-MCNC: 69 MG/DL (ref 0–100)
LYMPHOCYTES # BLD AUTO: 1.15 THOUSANDS/ΜL (ref 0.6–4.47)
LYMPHOCYTES NFR BLD AUTO: 32 % (ref 14–44)
MCH RBC QN AUTO: 33.3 PG (ref 26.8–34.3)
MCHC RBC AUTO-ENTMCNC: 33.2 G/DL (ref 31.4–37.4)
MCV RBC AUTO: 101 FL (ref 82–98)
MONOCYTES # BLD AUTO: 0.66 THOUSAND/ΜL (ref 0.17–1.22)
MONOCYTES NFR BLD AUTO: 18 % (ref 4–12)
NEUTROPHILS # BLD AUTO: 1.45 THOUSANDS/ΜL (ref 1.85–7.62)
NEUTS SEG NFR BLD AUTO: 41 % (ref 43–75)
NONHDLC SERPL-MCNC: 85 MG/DL
NRBC BLD AUTO-RTO: 0 /100 WBCS
PLATELET # BLD AUTO: 168 THOUSANDS/UL (ref 149–390)
PMV BLD AUTO: 9 FL (ref 8.9–12.7)
POTASSIUM SERPL-SCNC: 4 MMOL/L (ref 3.5–5.3)
PROT SERPL-MCNC: 7.8 G/DL (ref 6.4–8.2)
PSA SERPL-MCNC: 1.9 NG/ML (ref 0–4)
RBC # BLD AUTO: 4.32 MILLION/UL (ref 3.88–5.62)
SODIUM SERPL-SCNC: 139 MMOL/L (ref 136–145)
TRIGL SERPL-MCNC: 80 MG/DL
WBC # BLD AUTO: 3.58 THOUSAND/UL (ref 4.31–10.16)

## 2021-02-05 PROCEDURE — 36415 COLL VENOUS BLD VENIPUNCTURE: CPT

## 2021-02-05 PROCEDURE — 80061 LIPID PANEL: CPT

## 2021-02-05 PROCEDURE — 85025 COMPLETE CBC W/AUTO DIFF WBC: CPT

## 2021-02-05 PROCEDURE — 80053 COMPREHEN METABOLIC PANEL: CPT

## 2021-02-05 PROCEDURE — G0103 PSA SCREENING: HCPCS

## 2021-03-10 DIAGNOSIS — Z23 ENCOUNTER FOR IMMUNIZATION: ICD-10-CM

## 2021-03-17 ENCOUNTER — IMMUNIZATIONS (OUTPATIENT)
Dept: FAMILY MEDICINE CLINIC | Facility: HOSPITAL | Age: 66
End: 2021-03-17

## 2021-03-17 DIAGNOSIS — Z23 ENCOUNTER FOR IMMUNIZATION: Primary | ICD-10-CM

## 2021-03-17 PROCEDURE — 0011A SARS-COV-2 / COVID-19 MRNA VACCINE (MODERNA) 100 MCG: CPT

## 2021-03-17 PROCEDURE — 91301 SARS-COV-2 / COVID-19 MRNA VACCINE (MODERNA) 100 MCG: CPT

## 2021-04-15 ENCOUNTER — IMMUNIZATIONS (OUTPATIENT)
Dept: FAMILY MEDICINE CLINIC | Facility: HOSPITAL | Age: 66
End: 2021-04-15

## 2021-04-15 DIAGNOSIS — Z23 ENCOUNTER FOR IMMUNIZATION: Primary | ICD-10-CM

## 2021-04-15 PROCEDURE — 91301 SARS-COV-2 / COVID-19 MRNA VACCINE (MODERNA) 100 MCG: CPT

## 2021-04-15 PROCEDURE — 0012A SARS-COV-2 / COVID-19 MRNA VACCINE (MODERNA) 100 MCG: CPT

## 2022-04-07 ENCOUNTER — LAB REQUISITION (OUTPATIENT)
Dept: LAB | Facility: HOSPITAL | Age: 67
End: 2022-04-07
Payer: COMMERCIAL

## 2022-04-07 DIAGNOSIS — K57.30 DIVERTICULOSIS OF LARGE INTESTINE WITHOUT PERFORATION OR ABSCESS WITHOUT BLEEDING: ICD-10-CM

## 2022-04-07 DIAGNOSIS — K63.5 POLYP OF COLON: ICD-10-CM

## 2022-04-07 DIAGNOSIS — Z86.010 PERSONAL HISTORY OF COLONIC POLYPS: ICD-10-CM

## 2022-04-07 PROCEDURE — 88305 TISSUE EXAM BY PATHOLOGIST: CPT | Performed by: PATHOLOGY

## 2022-10-13 ENCOUNTER — LAB REQUISITION (OUTPATIENT)
Dept: LAB | Facility: HOSPITAL | Age: 67
End: 2022-10-13
Payer: COMMERCIAL

## 2022-10-13 DIAGNOSIS — Z86.010 PERSONAL HISTORY OF COLONIC POLYPS: ICD-10-CM

## 2022-10-13 DIAGNOSIS — K57.30 DIVERTICULOSIS OF LARGE INTESTINE WITHOUT PERFORATION OR ABSCESS WITHOUT BLEEDING: ICD-10-CM

## 2022-10-13 DIAGNOSIS — K63.5 POLYP OF COLON: ICD-10-CM

## 2022-10-13 PROCEDURE — 88342 IMHCHEM/IMCYTCHM 1ST ANTB: CPT | Performed by: STUDENT IN AN ORGANIZED HEALTH CARE EDUCATION/TRAINING PROGRAM

## 2022-10-13 PROCEDURE — 88341 IMHCHEM/IMCYTCHM EA ADD ANTB: CPT | Performed by: STUDENT IN AN ORGANIZED HEALTH CARE EDUCATION/TRAINING PROGRAM

## 2022-10-13 PROCEDURE — 88305 TISSUE EXAM BY PATHOLOGIST: CPT | Performed by: STUDENT IN AN ORGANIZED HEALTH CARE EDUCATION/TRAINING PROGRAM

## 2022-10-26 PROCEDURE — 88341 IMHCHEM/IMCYTCHM EA ADD ANTB: CPT | Performed by: STUDENT IN AN ORGANIZED HEALTH CARE EDUCATION/TRAINING PROGRAM

## 2022-10-26 PROCEDURE — 88305 TISSUE EXAM BY PATHOLOGIST: CPT | Performed by: STUDENT IN AN ORGANIZED HEALTH CARE EDUCATION/TRAINING PROGRAM

## 2022-10-26 PROCEDURE — 88342 IMHCHEM/IMCYTCHM 1ST ANTB: CPT | Performed by: STUDENT IN AN ORGANIZED HEALTH CARE EDUCATION/TRAINING PROGRAM

## 2023-05-08 ENCOUNTER — NURSE TRIAGE (OUTPATIENT)
Age: 68
End: 2023-05-08

## 2023-05-08 DIAGNOSIS — Z86.010 PERSONAL HISTORY OF COLONIC POLYPS: ICD-10-CM

## 2023-05-08 DIAGNOSIS — K57.90 DIVERTICULOSIS: Primary | ICD-10-CM

## 2023-05-08 NOTE — TELEPHONE ENCOUNTER
Patient calling to schedule recall colonoscopy with Dr Renuka Parekh in October  Verified information  Please call to schedule    10/13/22 [facility] TR 1 yr recall, polyps

## 2023-05-09 NOTE — TELEPHONE ENCOUNTER
10/13/2022 @ [facility] w/ TR; diverticulosis,  Mucosal leiomyoma x1,   Inflammatory pseudopolyp x1  1 yr recall  BMI 27  Scheduled for 10/2/2023, [facility], TR AS PER pt's preferences  Mailed information to pt  Additional Information  • Negative: Is this a new patient under the age of 48? • Negative: Is this a patient over the age of 76 that has never had a colonoscopy? • Negative: Is this patient over the age of [de-identified] with a history of cancer and/or polyps? • Negative: Has the patient had a colonoscopy within the last year and when was it? • Negative: Does the patient have a family history of colon or rectal cancer? • Negative: Does the patient experience chest pain or shortness of breath when climbing stairs? • Negative: Does the patient require oxygen support? • Negative: Has the patient had a cardiac procedure within the last year? • Negative: Has the patient had a stroke or blood clots? • Negative: Is the patient currently on any blood thinners such as Coumadin, Plavix, Eliquis, Pradaxa, Xarelto, etc?  (Check the patient's current medication list and document reason for taking medication)  • Negative: Has the patient had a knee or hip replacement within the last 6 months that required antibiotic coverage prior to the procedure? • Negative: Advised Patient - Initial Assessment  1  Patient advised that our office requires 72 hours notice for a cancellation of a procedure to avoid incurring a missed appointment fee  2  Asked patient to please contact insurance company to ask how they will be processing the individual claim for the procedure  3  Advised patient that he is welcome to see the doctor in the office prior to the procedure  4  Advised patient to be at the location of the procedure at 30 minutes prior to the scheduled time      Protocols used: [facility] COLONOSCOPY SCHEDULING

## 2023-10-02 ENCOUNTER — LAB REQUISITION (OUTPATIENT)
Dept: LAB | Facility: HOSPITAL | Age: 68
End: 2023-10-02
Payer: COMMERCIAL

## 2023-10-02 DIAGNOSIS — K63.5 POLYP OF COLON: ICD-10-CM

## 2023-10-02 DIAGNOSIS — Z86.010 PERSONAL HISTORY OF COLONIC POLYPS: ICD-10-CM

## 2023-10-02 DIAGNOSIS — K57.30 DIVERTICULOSIS OF LARGE INTESTINE WITHOUT PERFORATION OR ABSCESS WITHOUT BLEEDING: ICD-10-CM

## 2023-10-02 PROCEDURE — 88305 TISSUE EXAM BY PATHOLOGIST: CPT | Performed by: PATHOLOGY

## 2023-10-04 PROCEDURE — 88305 TISSUE EXAM BY PATHOLOGIST: CPT | Performed by: PATHOLOGY

## 2024-02-21 PROBLEM — K52.9 GASTROENTERITIS: Status: RESOLVED | Noted: 2018-08-16 | Resolved: 2024-02-21

## 2024-12-06 ENCOUNTER — OFFICE VISIT (OUTPATIENT)
Dept: PODIATRY | Facility: CLINIC | Age: 69
End: 2024-12-06
Payer: COMMERCIAL

## 2024-12-06 VITALS — HEIGHT: 74 IN | WEIGHT: 217 LBS | BODY MASS INDEX: 27.85 KG/M2

## 2024-12-06 DIAGNOSIS — M79.675 PAIN AROUND TOENAIL, LEFT FOOT: Primary | ICD-10-CM

## 2024-12-06 DIAGNOSIS — L60.3 DYSTROPHIC NAIL: ICD-10-CM

## 2024-12-06 PROCEDURE — 11730 AVULSION NAIL PLATE SIMPLE 1: CPT

## 2024-12-06 PROCEDURE — 99203 OFFICE O/P NEW LOW 30 MIN: CPT

## 2024-12-06 NOTE — PROGRESS NOTES
"Name: Saeid Yi      : 1955      MRN: [id number]  Encounter Provider: Antonio South DPM  Encounter Date: 2024   Encounter department: [facility] [place]  :  Assessment & Plan  Pain around toenail, left foot         Dystrophic nail         Plan:  Diagnosis and options discussed with patient.  Patient agreeable to the plan as stated below.  Total nail avulsion procedure performed, see procedure note.   Patient is to soak in warm water daily starting tomorrow followed by Aquafor/Vaseline dressing.   If there are any acute signs of infection (redness, swelling, purulent drainage, fever, chills), patient was instructed to go to the emergency department for evaluation.  Follow up in 2-3 weeks for recheck.     History of Present Illness   HPI  Saeid Yi is a 69 y.o. male who presents with painful dystrophic, discolored left great toe. He states that toenail has been infected on and off for the past couple months. He has had oral antibiotics that has helped. He denies any recent fever, chills or drainage from toe.    History obtained from: patient    Review of Systems  Current Outpatient Medications on File Prior to Visit   Medication Sig Dispense Refill    amLODIPine (NORVASC) 5 mg tablet Take 10 mg by mouth daily        aspirin (ECOTRIN LOW STRENGTH) 81 mg EC tablet Take 81 mg by mouth daily      atorvastatin (LIPITOR) 10 mg tablet Take 10 mg by mouth daily        benazepril-hydrochlorthiazide (LOTENSIN HCT) 20-12.5 MG per tablet Take 1 tablet by mouth 2 (two) times a day      Multiple Vitamin (MULTIVITAMIN) tablet Take 1 tablet by mouth daily      acetaminophen (TYLENOL) 325 mg tablet Take 1 tablet (325 mg total) by mouth every 6 (six) hours as needed for fever 30 tablet 0     No current facility-administered medications on file prior to visit.         Objective   Ht 6' 2\" (1.88 m)   Wt 98.4 kg (217 lb)   BMI 27.86 kg/m²      Physical Exam  Cardiovascular:      Pulses:           " "Dorsalis pedis pulses are 2+ on the left side.        Posterior tibial pulses are 2+ on the left side.   Musculoskeletal:        Feet:    Feet:      Left foot:      Toenail Condition: Left toenails are abnormally thick.       Nail removal    Date/Time: 12/6/2024 10:00 AM    Performed by: Antonio South DPM  Authorized by: Antonio South DPM    Patient location:  Clinic  Indications / Diagnosis:  Ingrown nail  Universal Protocol:  procedure performed by consultantConsent: Verbal consent obtained.  Risks and benefits: risks, benefits and alternatives were discussed  Consent given by: patient  Time out: Immediately prior to procedure a \"time out\" was called to verify the correct patient, procedure, equipment, support staff and site/side marked as required.  Timeout called at: 12/6/2024 1:07 PM.  Patient understanding: patient states understanding of the procedure being performed    Location:     Foot:  L big toe  Pre-procedure details:     Skin preparation:  Betadine    Preparation: Patient was prepped and draped in the usual sterile fashion    Anesthesia (see MAR for exact dosages):     Anesthesia method:  Nerve block    Block needle gauge:  25 G    Block anesthetic:  Lidocaine 1% w/o epi    Block technique:  Digital Block    Block outcome:  Anesthesia achieved  Nail Removal:     Nail removed:  Complete    Nail bed sutured: no    Ingrown nail:     Wedge excision of skin: no      Nail matrix removed or ablated:  None  Post-procedure details:     Dressing:  4x4 sterile gauze, antibiotic ointment, gauze roll and petrolatum-impregnated gauze    Patient tolerance of procedure:  Tolerated well, no immediate complications  Comments:      Discussion with patient was completed today regarding diagnosis and potential etiologies as well as treatment options for this ingrown nail diagnosis.  Discussed how to avoid recurrence and possible treatment options if recurrence does occur.    Procedure:    The nail was freed up from the " underlying nailbed with a hemostat.  Subsequently it was removed in toto.  The underlying nailbed was evaluated and examined.  Dressings were applied with antibiotic ointment and dry dressing to the area.  Postoperative instructions were given to patient today.  Procedure was completed without incident.    Antibiotic ointment applied to border with bandage dressing  Pt instructed to keep dressing intact for 24 hours.  After this time use triple antibiotic ointment to the area and a dry dressing changed daily  Return to clinic in about 3 weeks for reevaluation.  If ingrown nail recurs can consider use of phenol at nail matrix.  If notice any redness, swelling, drainage, or excessive pain or signs of infection to notify the office sooner.  Procedure completed without incident.  Do not soak foot.